# Patient Record
Sex: FEMALE | Race: WHITE | URBAN - METROPOLITAN AREA
[De-identification: names, ages, dates, MRNs, and addresses within clinical notes are randomized per-mention and may not be internally consistent; named-entity substitution may affect disease eponyms.]

---

## 2017-08-18 ENCOUNTER — RADIANT APPOINTMENT (OUTPATIENT)
Dept: GENERAL RADIOLOGY | Facility: OTHER | Age: 62
End: 2017-08-18
Attending: ORTHOPAEDIC SURGERY
Payer: COMMERCIAL

## 2017-08-18 ENCOUNTER — OFFICE VISIT (OUTPATIENT)
Dept: ORTHOPEDICS | Facility: OTHER | Age: 62
End: 2017-08-18
Payer: COMMERCIAL

## 2017-08-18 VITALS — TEMPERATURE: 97 F | HEIGHT: 62 IN | WEIGHT: 148 LBS | BODY MASS INDEX: 27.23 KG/M2

## 2017-08-18 DIAGNOSIS — S76.312A LEFT HAMSTRING MUSCLE STRAIN, INITIAL ENCOUNTER: Primary | ICD-10-CM

## 2017-08-18 DIAGNOSIS — M25.562 LEFT KNEE PAIN: ICD-10-CM

## 2017-08-18 PROCEDURE — 73562 X-RAY EXAM OF KNEE 3: CPT | Mod: LT

## 2017-08-18 PROCEDURE — 99203 OFFICE O/P NEW LOW 30 MIN: CPT | Performed by: ORTHOPAEDIC SURGERY

## 2017-08-18 ASSESSMENT — PAIN SCALES - GENERAL: PAINLEVEL: NO PAIN (0)

## 2017-08-18 NOTE — MR AVS SNAPSHOT
After Visit Summary   8/18/2017    Allyn Pascal    MRN: [id number]           Patient Information     Date Of Birth          1955        Visit Information        Provider Department      8/18/2017 4:10 PM Rasheeda Seaman MD [facility]        Today's Diagnoses     Left hamstring muscle strain, initial encounter    -  1      Care Instructions    Encounter Diagnosis   Name Primary?     Left hamstring muscle strain, initial encounter Yes     Rest, ice and elevate above heart level as needed for pain control  1. Your xrays look good, no fracture and good bone.  2. We know you had some stiffness when bending but it has been getting better.   3. Continue with Max Freeze if it is helping.  4. Holding on medication for now.  5. We feel you have a hamstring strain.  This will take 1-2 months to get better.  6. We have exercises below.  7. We discussed a brace that may help you; but we decided to hold off on that today.   8. Follow up with Rasheeda Seaman MD and/or William Moore PA-C on an as needed basis.    Wall Squats for ACL Healing    After you regain muscle control, it s time to build strength. This helps you put full weight on your leg. For best results, warm up and stretch before starting. If your injury is recent, wait until swelling and pain decrease before doing this exercise:    Lean against a wall with your feet hip-width apart. Your feet should be about 18 inches from the wall.    Slowly slide down to a near-sitting position. Don t let your knees go past 90 degrees.    Hold for 10 seconds, then slide back up.    Repeat 5 times.     CAUTION: Do this exercise only if your healthcare provider says it s OK.     9492-2639 [employer]. [address] [place] [address]. All rights reserved. This information is not intended as a substitute for professional medical care. Always follow your healthcare professional's instructions.        Leg and Knee  Exercises: Leg Raise    This exercise is designed to stretch and strengthen your knee. Before beginning, read through all the instructions. While exercising, breathe normally and use smooth movements. If you feel any pain, stop the exercise. If pain persists, call your healthcare provider.  Caution    Don t arch your back.    Don t hunch your shoulders.     Sit on the floor with your_________ leg straight, the other bent.    Tighten the thigh muscles on the top of your straight leg. You should feel the muscles contract. Raise that leg 6-8 inches. Then lower it slowly and steadily to the floor. Relax.    Repeat ______ times.  Do ______ sets a day.    6593-2837 [employer]. [address] [place] [address]. All rights reserved. This information is not intended as a substitute for professional medical care. Always follow your healthcare professional's instructions.        Quad Set for Leg and Knee    This exercise is designed to stretch and strengthen your knee. Before beginning, read through all the instructions. While exercising, breathe normally and use smooth movements. If you feel any pain, stop the exercise. If pain persists, call your healthcare provider.  1.  Sit on the floor with one leg straight, the other bent.  2.  Flex the foot of your straight leg by pointing your toes toward you. Press the back of your knee into the floor while tightening the muscle on the top of your thigh. Hold for ______ seconds. Then relax.  3.  Repeat ______ times. Do ______ sets a day.  Caution    Don t arch your back.    Don t hunch your shoulders.    1668-8681 [employer]. [address] [place] [address]. All rights reserved. This information is not intended as a substitute for professional medical care. Always follow your healthcare professional's instructions.        WebMD and [employer] may offer reliable information regarding your diagnosis and treatment plan.    THANK YOU  for coming in today. If you receive a survey via [employer] or mail please let us know if there was anything you especially appreciated today or if there is any way we can improve our clinic. We appreciate your input.    GENERAL INFORMATION:  Our hours are:  Monday :     Clinic 7:30 AM-430 PM ([facility])  Tuesday:      Operating Room All Day ([facility])  Wednesday: Clinic 7:30 AM - 11:15 AM ([facility])             Clinic 1:00 PM - 4:00PM ([facility])  Thursday:     Administrative Day  Friday:          Clinic 7:30 AM - 11:15 AM ([facility])            Clinic 1:00 PM - 4:00 PM ([facility])      Bone and Joint Service Line for any issues or concerns: [phone]      We are not in the office Thursdays. Therefore non- urgent calls and medical messages received on Thursday will be addressed when we are back in the office on Wednesday. Urgent matters will be reviewed and addressed by one of our partners in the office as needed.    If lab work was done today as part of your evaluation you will generally be contacted via [employer], mail, or phone with the results within 1-5 days. If there is an alarming result we will contact you by phone. Lab results come back at varying times, I generally wait until all labs are resulted before making comments on results. Please note labs are automatically released to [employer] (if you have signed up for it) once available-at times you may see these prior to my having a chance to review them as well.    If you need refills please contact your pharmacist. They will send a refill request to me to review. Please allow 3 business days for us to process all refill requests. All narcotic refills should be handled in the clinic at the time of your visit.       Hamstring Stretch    Begin your rehabilitation with exercises that develop muscle control. These help you meet  basic goals, like driving a car or going back to work. Exercise as often as you re advised. But stop right away if any exercise causes sharp or increasing pain. Icing your knee for 15 to 20 minutes after exercise can help prevent swelling and soreness.    Lie on your back with your good knee bent. Put a towel around the back of your injured leg. Tighten your stomach muscles.    Keeping the knee as straight as you can, slowly pull on the towel to bring your injured leg up. Raise it as far as you comfortably can.    Hold for 30 to 60 seconds. Repeat 2 to 3 times.   Caution: If you feel tingling or pain in your back or legs, you re not yet ready for this exercise or are pulling too aggressively.   For your safety, check with your healthcare provider before starting an exercise program.   Date Last Reviewed: 8/16/2015 2000-2017 [employer]. [address] [place] [address]. All rights reserved. This information is not intended as a substitute for professional medical care. Always follow your healthcare professional's instructions.                Follow-ups after your visit        Follow-up notes from your care team     Return if symptoms worsen or fail to improve.      Who to contact     If you have questions or need follow up information about today's clinic visit or your schedule please contact [facility] directly at [phone].  Normal or non-critical lab and imaging results will be communicated to you by MyChart, letter or phone within 4 business days after the clinic has received the results. If you do not hear from us within 7 days, please contact the clinic through MyChart or phone. If you have a critical or abnormal lab result, we will notify you by phone as soon as possible.  Submit refill requests through [employer] or call your pharmacy and they will forward the refill request to us. Please allow 3 business days for your refill to be completed.          Additional  "Information About Your Visit        MyChart Information     [employer] lets you send messages to your doctor, view your test results, renew your prescriptions, schedule appointments and more. To sign up, go to [email or web address] . Click on \"Log in\" on the left side of the screen, which will take you to the Welcome page. Then click on \"Sign up Now\" on the right side of the page.     You will be asked to enter the access code listed below, as well as some personal information. Please follow the directions to create your username and password.     Your access code is: [email or web address]  Expires: 2017  4:46 PM     Your access code will  in 90 days. If you need help or a new code, please call your [facility] clinic or [phone].        Care EveryWhere ID     This is your Care EveryWhere ID. This could be used by other organizations to access your [facility] medical records  [id number]        Your Vitals Were     Temperature Height BMI (Body Mass Index)             97  F (36.1  C) 1.562 m (5' 1.5\") 27.51 kg/m2          Blood Pressure from Last 3 Encounters:   01/22/15 132/80   09 130/90   08 120/80    Weight from Last 3 Encounters:   17 67.1 kg (148 lb)   01/22/15 63.5 kg (140 lb)   09 64.9 kg (143 lb)               Primary Care Provider Office Phone # Fax #    Criss Wanda Odom [phone]       [address] [place] [address]        Equal Access to Services     [facility]: Hadii nicholas garcia hadasho Sokayleen, waaxda luqadaha, qaybta kaalmarober koehler. So [facility] [phone].    ATENCIÓN: Si habla español, tiene a bess disposición servicios gratuitos de asistencia lingüística. Llame al [phone].    We comply with applicable federal civil rights laws and [place] laws. We do not discriminate on the basis of race, color, national origin, age, disability sex, sexual orientation or gender identity.            Thank you!     Thank " you for choosing [facility]  for your care. Our goal is always to provide you with excellent care. Hearing back from our patients is one way we can continue to improve our services. Please take a few minutes to complete the written survey that you may receive in the mail after your visit with us. Thank you!             Your Updated Medication List - Protect others around you: Learn how to safely use, store and throw away your medicines at www.disposemymeds.org.          This list is accurate as of: 8/18/17  4:46 PM.  Always use your most recent med list.                   Brand Name Dispense Instructions for use Diagnosis    cyclobenzaprine 5 MG tablet    FLEXERIL    30 tablet    Take 1 tablet (5 mg) by mouth 3 times daily as needed for muscle spasms    Neck sprain, initial encounter       * INDERAL 20 MG tablet   Generic drug:  propranolol     68    one tab twice daily    Migraine, unspecified, without mention of intractable migraine without mention of status migrainosus       * propranolol HCl 60 MG Cp24     90 capsule    Take 1 capsule by mouth daily.    Migraine, unspecified, without mention of intractable migraine without mention of status migrainosus       * Notice:  This list has 2 medication(s) that are the same as other medications prescribed for you. Read the directions carefully, and ask your doctor or other care provider to review them with you.

## 2017-08-18 NOTE — NURSING NOTE
"Chief Complaint   Patient presents with     Consult     left knee        Initial Temp 97  F (36.1  C)  Ht 1.562 m (5' 1.5\")  Wt 67.1 kg (148 lb)  BMI 27.51 kg/m2 Estimated body mass index is 27.51 kg/(m^2) as calculated from the following:    Height as of this encounter: 1.562 m (5' 1.5\").    Weight as of this encounter: 67.1 kg (148 lb).  Medication Reconciliation: complete    BP completed using cuff size: NA (Not Taken)    Harmony Landeros MA      "

## 2017-08-18 NOTE — PROGRESS NOTES
ORTHOPEDIC CONSULT      Chief Complaint: Allyn Pascal is a 61 year old female who is OOW.      She is being seen for   Chief Complaints and History of Present Illnesses   Patient presents with     Consult     left knee          History of Present Illness:   Allyn Pascal is a 61 year old female who is seen in consultation at the request of no one.  History of Present illness:  Allyn presents for evaluation of:  1.) left knee  Onset:  2 days ago    Symptoms brought on by not sure.   Location:  left knee.    Character:  numbness.    Progression of symptoms:  better.    Previous similar pain: no .   Pain Level:  0/10.   Previous treatments:  deep heating rub.  Currently on Blood thinners? no  Diagnosis of Diabetes? No  Was moving heavy boxes and started having knee pain a few days ago  Has decreased ROM, lat>med knee pain  Has been using a maxi freeze rub which helps  Has improved, increased ROM now      Patient's past medical, surgical, social and family histories reviewed.     Past Medical History:   Diagnosis Date     Endometriosis, site unspecified      Migraine, unspecified, without mention of intractable migraine without mention of status migrainosus      Other and unspecified ovarian cyst        Past Surgical History:   Procedure Laterality Date     C NONSPECIFIC PROCEDURE  02/24/2003    Exam under anesthesia, exploratory laparotomy, left ureterolysis, left salpingo-oophorectomy, extensive lysis of adhesions, rigid proctoscopy, repair of serosal tear of sigmoid colon.     C TOTAL ABDOM HYSTERECTOMY  1994    Hysterectomy, Total Abdominal     HC REMOVAL OF OVARY/TUBE(S)  02/24/2003    Salpingo-Oophorectomy, Unilateral Left     HC REMOVAL OF TONSILS,<10 Y/O      Tonsils <11y.o.       Medications:    Current Outpatient Prescriptions on File Prior to Visit:  cyclobenzaprine (FLEXERIL) 5 MG tablet Take 1 tablet (5 mg) by mouth 3 times daily as needed for muscle spasms   Propranolol HCl 60 MG CP24 Take 1 capsule by  "mouth daily.   INDERAL 20 MG OR TABS one tab twice daily     No current facility-administered medications on file prior to visit.     Allergies   Allergen Reactions     Sulfa Drugs      difficult breathing       Social History     Occupational History     Not on file.     Social History Main Topics     Smoking status: Passive Smoke Exposure - Never Smoker     Smokeless tobacco: Not on file      Comment:  smokes in the home     Alcohol use No     Drug use: Not on file     Sexual activity: Yes     Partners: Male       No family history on file.    REVIEW OF SYSTEMS  10 point review systems performed otherwise negative as noted as per history of present illness.    Physical Exam:  Vitals: Temp 97  F (36.1  C)  Ht 1.562 m (5' 1.5\")  Wt 67.1 kg (148 lb)  BMI 27.51 kg/m2  BMI= Body mass index is 27.51 kg/(m^2).    Constitutional: healthy, alert and no acute distress   Psychiatric: mentation appears normal and affect normal/bright  NEURO: no focal deficits  RESP: Normal with easy respirations and no use of accessory muscles to breathe, no audible wheezing or retractions  CV: regular pulse  SKIN: No erythema, rashes, excoriation, or breakdown. No evidence of infection.   JOINT/EXTREMITIES:left Knee Exam: Inspection: AP/lateral alignment normal, No effusion, No quad atrophy  Tender: medial and lateral hamstring region  Non-tender: MCL, LCL, lateral joint line, medial joint line  Active Range of Motion: all normal  Strength: normal  Special tests: normal Valgus stress test, normal Varus, negative Lachman's test      GAIT: antalgic  Lymph: no palpable lymph nodes    Diagnostic Modalities:  left knee X-ray: No fracture, dislocation and or lesion. Normal alignment.  Joint space maintained no significant arthritis. No appreciable soft tissue abnormality  Independent visualization of the images was performed.      Impression: left hamstring strain    Plan:  All of the above pertinent physical exam and imaging modalities " findings was reviewed with Allyn.                                          CONSERVATIVE CARE:  I recommend conservative care for the patient to include focused self directed physical therapy, activity modifications. Today I provided or dispensed info and hep.                                                FUTURE PLAN:  On their return if they still have symptoms we will consider physical therapy, MRI, NSAID's.      Return to clinic PRN, or sooner as needed for changes.  Re-x-ray on return: No    Rasheeda Seaman M.D.

## 2017-08-18 NOTE — PATIENT INSTRUCTIONS
Encounter Diagnosis   Name Primary?     Left hamstring muscle strain, initial encounter Yes     Rest, ice and elevate above heart level as needed for pain control  1. Your xrays look good, no fracture and good bone.  2. We know you had some stiffness when bending but it has been getting better.   3. Continue with Max Freeze if it is helping.  4. Holding on medication for now.  5. We feel you have a hamstring strain.  This will take 1-2 months to get better.  6. We have exercises below.  7. We discussed a brace that may help you; but we decided to hold off on that today.   8. Follow up with Rasheeda Seaman MD and/or William Moore PA-C on an as needed basis.    Wall Squats for ACL Healing    After you regain muscle control, it s time to build strength. This helps you put full weight on your leg. For best results, warm up and stretch before starting. If your injury is recent, wait until swelling and pain decrease before doing this exercise:    Lean against a wall with your feet hip-width apart. Your feet should be about 18 inches from the wall.    Slowly slide down to a near-sitting position. Don t let your knees go past 90 degrees.    Hold for 10 seconds, then slide back up.    Repeat 5 times.     CAUTION: Do this exercise only if your healthcare provider says it s OK.     3368-8886 [employer]. [address] [place] [address], [place] [id number]. All rights reserved. This information is not intended as a substitute for professional medical care. Always follow your healthcare professional's instructions.        Leg and Knee Exercises: Leg Raise    This exercise is designed to stretch and strengthen your knee. Before beginning, read through all the instructions. While exercising, breathe normally and use smooth movements. If you feel any pain, stop the exercise. If pain persists, call your healthcare provider.  Caution    Don t arch your back.    Don t hunch your shoulders.     Sit on the floor  with your_________ leg straight, the other bent.    Tighten the thigh muscles on the top of your straight leg. You should feel the muscles contract. Raise that leg 6-8 inches. Then lower it slowly and steadily to the floor. Relax.    Repeat ______ times.  Do ______ sets a day.    3499-9813 [employer]. [address] [place] [address]. All rights reserved. This information is not intended as a substitute for professional medical care. Always follow your healthcare professional's instructions.        Quad Set for Leg and Knee    This exercise is designed to stretch and strengthen your knee. Before beginning, read through all the instructions. While exercising, breathe normally and use smooth movements. If you feel any pain, stop the exercise. If pain persists, call your healthcare provider.  1.  Sit on the floor with one leg straight, the other bent.  2.  Flex the foot of your straight leg by pointing your toes toward you. Press the back of your knee into the floor while tightening the muscle on the top of your thigh. Hold for ______ seconds. Then relax.  3.  Repeat ______ times. Do ______ sets a day.  Caution    Don t arch your back.    Don t hunch your shoulders.    1705-8716 [employer]. [address] [place] [address]. All rights reserved. This information is not intended as a substitute for professional medical care. Always follow your healthcare professional's instructions.        [employer] and [employer] may offer reliable information regarding your diagnosis and treatment plan.    THANK YOU for coming in today. If you receive a survey via [employer] or mail please let us know if there was anything you especially appreciated today or if there is any way we can improve our clinic. We appreciate your input.    GENERAL INFORMATION:  Our hours are:  Monday :     Clinic 7:30 AM-430 PM ([facility])  Tuesday:      Operating Room All Day ([facility])  Wednesday: Clinic 7:30 AM - 11:15 AM ([facility])             Clinic 1:00 PM - 4:00PM ([facility])  Thursday:     Administrative Day  Friday:          Clinic 7:30 AM - 11:15 AM ([facility])            Clinic 1:00 PM - 4:00 PM ([facility])      Bone and Joint Service Line for any issues or concerns: [phone]      We are not in the office Thursdays. Therefore non- urgent calls and medical messages received on Thursday will be addressed when we are back in the office on Wednesday. Urgent matters will be reviewed and addressed by one of our partners in the office as needed.    If lab work was done today as part of your evaluation you will generally be contacted via [employer], mail, or phone with the results within 1-5 days. If there is an alarming result we will contact you by phone. Lab results come back at varying times, I generally wait until all labs are resulted before making comments on results. Please note labs are automatically released to [employer] (if you have signed up for it) once available-at times you may see these prior to my having a chance to review them as well.    If you need refills please contact your pharmacist. They will send a refill request to me to review. Please allow 3 business days for us to process all refill requests. All narcotic refills should be handled in the clinic at the time of your visit.       Hamstring Stretch    Begin your rehabilitation with exercises that develop muscle control. These help you meet basic goals, like driving a car or going back to work. Exercise as often as you re advised. But stop right away if any exercise causes sharp or increasing pain. Icing your knee for 15 to 20 minutes after exercise can help prevent swelling and soreness.    Lie on your back with your good knee bent. Put a towel around the back of your injured leg. Tighten your stomach  muscles.    Keeping the knee as straight as you can, slowly pull on the towel to bring your injured leg up. Raise it as far as you comfortably can.    Hold for 30 to 60 seconds. Repeat 2 to 3 times.   Caution: If you feel tingling or pain in your back or legs, you re not yet ready for this exercise or are pulling too aggressively.   For your safety, check with your healthcare provider before starting an exercise program.   Date Last Reviewed: 8/16/2015 2000-2017 [employer]. [address] [place] [address], [place] [address]. All rights reserved. This information is not intended as a substitute for professional medical care. Always follow your healthcare professional's instructions.

## 2017-11-05 ENCOUNTER — TRANSFERRED RECORDS (OUTPATIENT)
Dept: HEALTH INFORMATION MANAGEMENT | Facility: CLINIC | Age: 62
End: 2017-11-05

## 2017-11-13 ENCOUNTER — TELEPHONE (OUTPATIENT)
Dept: FAMILY MEDICINE | Facility: OTHER | Age: 62
End: 2017-11-13

## 2017-11-13 NOTE — PROGRESS NOTES
SUBJECTIVE:                                                    Allyn Pascal is a 61 year old female who presents to clinic today for the following health issues:    HPI    Concern - Right shoulder and arm pain   Onset: last week Sunday     Description:   Fell down the stairs, scuffed up right elbow. Hard to lift arm, hurts on the inside     Intensity: moderate    Progression of Symptoms:  same    (was seen at [facility] on 11/5/17, said to follow up if it wasn't getting better)    Therapies Tried and outcome: ice and ibuprofen, has also been using oils.     Had additional fall in March 2017 where she also landed on same right elbow stating that has had pain off and on since this time. Now is much worse with current.     XR HUMERUS ROUTINE RT 2 VIEWS11/6/2017  [facility]  Result Narrative   EXAMINATION:  TWO VIEW RIGHT HUMERUS:  11/06/2017     CLINICAL DATA:  Injury to the right humerus with right humerus pain.      FINDINGS:  Two views of the right humerus demonstrate no evidence of acute   fractures or dislocations.      IMPRESSION:  Normal examination.      Status Results Details              Problem list and histories reviewed & adjusted, as indicated.  Additional history: as documented    BP Readings from Last 3 Encounters:   11/14/17 138/84   01/22/15 132/80   01/21/09 130/90    Wt Readings from Last 3 Encounters:   11/14/17 156 lb (70.8 kg)   08/18/17 148 lb (67.1 kg)   01/22/15 140 lb (63.5 kg)        Labs reviewed in EPIC      ROS:  Constitutional, HEENT, cardiovascular, pulmonary, gi and gu systems are negative, except as otherwise noted.      OBJECTIVE:   /84  Pulse 66  Temp 98  F (36.7  C) (Oral)  Resp 16  Wt 156 lb (70.8 kg)  BMI 29 kg/m2  Body mass index is 29 kg/(m^2).  GENERAL: healthy, alert and no distress  NECK: no adenopathy, no asymmetry, masses, or scars and thyroid normal to palpation  MS: right arm able flex elbow without pain, has  increased pain with extension only. Negative for inflammation, erythema, or warmth. Able to lift arm to shoulder height without pain. Pain increased with lowering are. Pain is in elbow joint.   SKIN: no suspicious lesions or rashes  NEURO: Normal strength and tone, mentation intact and speech normal    Diagnostic Test Results:  none     ASSESSMENT/PLAN:     1. Elbow injury, right, initial encounter  Recommend further evaluation and treatment plan by orthopedic specialty   - ORTHO  REFERRAL    2. Need for hepatitis C screening test  declined    3. Need for prophylactic vaccination and inoculation against influenza  declined    4. Breast screening declined  declined    Home care instructions were reviewed with the patient. The risks, benefits and treatment options of prescribed medications or other treatments have been discussed with the patient. The patient verbalized their understanding and should call or follow up if no improvement or if they develop further problems.  Return to clinic prn    Patient Instructions   Please follow up with Dr. Seaman or Dr. Radford for further evaluation and treatment plan.     Thank you  Leeanne Parrish [facility]

## 2017-11-13 NOTE — TELEPHONE ENCOUNTER
Patient was seen at [facility] and had some arm pain, she had xray and showed no fracture. They said pehaps her PCP would want to order an MRI?   Please call to discuss.

## 2017-11-14 ENCOUNTER — OFFICE VISIT (OUTPATIENT)
Dept: FAMILY MEDICINE | Facility: OTHER | Age: 62
End: 2017-11-14
Payer: COMMERCIAL

## 2017-11-14 VITALS
RESPIRATION RATE: 16 BRPM | SYSTOLIC BLOOD PRESSURE: 138 MMHG | DIASTOLIC BLOOD PRESSURE: 84 MMHG | TEMPERATURE: 98 F | BODY MASS INDEX: 29 KG/M2 | WEIGHT: 156 LBS | HEART RATE: 66 BPM

## 2017-11-14 DIAGNOSIS — Z23 NEED FOR PROPHYLACTIC VACCINATION AND INOCULATION AGAINST INFLUENZA: ICD-10-CM

## 2017-11-14 DIAGNOSIS — Z53.20 BREAST SCREENING DECLINED: ICD-10-CM

## 2017-11-14 DIAGNOSIS — S59.901A ELBOW INJURY, RIGHT, INITIAL ENCOUNTER: Primary | ICD-10-CM

## 2017-11-14 DIAGNOSIS — Z11.59 NEED FOR HEPATITIS C SCREENING TEST: ICD-10-CM

## 2017-11-14 PROCEDURE — 99213 OFFICE O/P EST LOW 20 MIN: CPT | Performed by: NURSE PRACTITIONER

## 2017-11-14 NOTE — PATIENT INSTRUCTIONS
Please follow up with Dr. Seaman or Dr. Radford for further evaluation and treatment plan.     Thank you  Leeanne Parrish CNP

## 2017-11-14 NOTE — NURSING NOTE
"Chief Complaint   Patient presents with     Musculoskeletal Problem     Panel Management     shakeel, mychart, flu, mammo, honoring choices, hep c       Initial /82 (BP Location: Left arm, Patient Position: Chair, Cuff Size: Adult Large)  Pulse 66  Temp 98  F (36.7  C) (Oral)  Resp 16  Wt 156 lb (70.8 kg)  BMI 29 kg/m2 Estimated body mass index is 29 kg/(m^2) as calculated from the following:    Height as of 8/18/17: 5' 1.5\" (1.562 m).    Weight as of this encounter: 156 lb (70.8 kg).  Medication Reconciliation: complete    "

## 2017-11-14 NOTE — MR AVS SNAPSHOT
After Visit Summary   11/14/2017    Allyn Pascal    MRN: [id number]           Patient Information     Date Of Birth          1955        Visit Information        Provider Department      11/14/2017 2:00 PM Leeanne Parrish APRN CNP [facility]        Today's Diagnoses     Elbow injury, right, initial encounter    -  1    Visit for screening mammogram        Need for hepatitis C screening test        Need for prophylactic vaccination and inoculation against influenza          Care Instructions    Please follow up with Dr. Seaman or Dr. Radford for further evaluation and treatment plan.     Thank you  Leeanne Parrish CNP            Follow-ups after your visit        Additional Services     ORTHO  REFERRAL       [facility] is referring you to the Orthopedic  Services at [facility].       The  Representative will assist you in the coordination of your Orthopedic and Musculoskeletal Care as prescribed by your physician.    The  Representative will call you within 1 business day to help schedule your appointment, or you may contact the  Representative at:    All areas ~ [phone]     Type of Referral : Surgical / Specialist       Timeframe requested: 3 - 5 days    Coverage of these services is subject to the terms and limitations of your health insurance plan.  Please call member services at your health plan with any benefit or coverage questions.      If X-rays, CT or MRI's have been performed, please contact the facility where they were done to arrange for , prior to your scheduled appointment.  Please bring this referral request to your appointment and present it to your specialist.                  Who to contact     If you have questions or need follow up information about today's clinic visit or your schedule please contact [facility] directly at  [phone].  Normal or non-critical lab and imaging results will be communicated to you by MyChart, letter or phone within 4 business days after the clinic has received the results. If you do not hear from us within 7 days, please contact the clinic through MyChart or phone. If you have a critical or abnormal lab result, we will notify you by phone as soon as possible.  Submit refill requests through [employer] or call your pharmacy and they will forward the refill request to us. Please allow 3 business days for your refill to be completed.          Additional Information About Your Visit        Care EveryWhere ID     This is your Care EveryWhere ID. This could be used by other organizations to access your [place] medical records  [id number]        Your Vitals Were     Pulse Temperature Respirations BMI (Body Mass Index)          66 98  F (36.7  C) (Oral) 16 29 kg/m2         Blood Pressure from Last 3 Encounters:   11/14/17 142/82   01/22/15 132/80   01/21/09 130/90    Weight from Last 3 Encounters:   11/14/17 156 lb (70.8 kg)   08/18/17 148 lb (67.1 kg)   01/22/15 140 lb (63.5 kg)              We Performed the Following     ORTHO  REFERRAL        Primary Care Provider Office Phone # Fax #    Criss Odom [phone]       [address] [place] [address]        Equal Access to Services     [facility] : Hadii aad ku hadasho Soomaali, waaxda luqadaha, qaybta kaalmada denny, rober dugan . So [facility] [phone].    ATENCIÓN: Si habla español, tiene a bess disposición servicios gratuitos de asistencia lingüística. Amy al [phone].    We comply with applicable federal civil rights laws and [place] laws. We do not discriminate on the basis of race, color, national origin, age, disability, sex, sexual orientation, or gender identity.            Thank you!     Thank you for choosing [facility]  for your care. Our goal is always to  provide you with excellent care. Hearing back from our patients is one way we can continue to improve our services. Please take a few minutes to complete the written survey that you may receive in the mail after your visit with us. Thank you!             Your Updated Medication List - Protect others around you: Learn how to safely use, store and throw away your medicines at www.disposemymeds.org.      Notice  As of 11/14/2017  2:50 PM    You have not been prescribed any medications.

## 2017-11-20 NOTE — PROGRESS NOTES
ORTHOPEDIC CONSULT      Chief Complaint: Allyn Pascal is a 61 year old right hand dominant female who works as a retired.      She is being seen for   Chief Complaints and History of Present Illnesses   Patient presents with     Consult     rt shoulder pain from fall per Leeanne Parrish CNP         History of Present Illness:   Allyn Pascal is a 61 year old female who is seen in consultation at the request of Leeanne Parrish CNP.  History of Present illness:  Allyn presents for evaluation of:  1.rt humerus  Onset: early and November  Symptoms brought on by fall.   Progression of symptoms:  same.    Previous similar pain: YES.   Pain Level:  6/10.   Previous treatments:  ice and Ibuprofen.  Currently on Blood thinners? no  Diagnosis of Diabetes? no  Pain lateral shoulder and inner arm  Doing HEP, ibuprofen, ice - all help      Patient's past medical, surgical, social and family histories reviewed.     Past Medical History:   Diagnosis Date     Endometriosis, site unspecified      Migraine, unspecified, without mention of intractable migraine without mention of status migrainosus      Other and unspecified ovarian cyst        Past Surgical History:   Procedure Laterality Date     C NONSPECIFIC PROCEDURE  02/24/2003    Exam under anesthesia, exploratory laparotomy, left ureterolysis, left salpingo-oophorectomy, extensive lysis of adhesions, rigid proctoscopy, repair of serosal tear of sigmoid colon.     C TOTAL ABDOM HYSTERECTOMY  1994    Hysterectomy, Total Abdominal     HC REMOVAL OF OVARY/TUBE(S)  02/24/2003    Salpingo-Oophorectomy, Unilateral Left     HC REMOVAL OF TONSILS,<13 Y/O      Tonsils <11y.o.       Medications:    No current outpatient prescriptions on file prior to visit.  No current facility-administered medications on file prior to visit.     Allergies   Allergen Reactions     Sulfa Drugs      difficult breathing       Social History     Occupational History     Not on file.     Social History Main  "Topics     Smoking status: Passive Smoke Exposure - Never Smoker     Smokeless tobacco: Never Used      Comment:  smokes in the home     Alcohol use No     Drug use: Not on file     Sexual activity: Yes     Partners: Male       No pertinent family history.    REVIEW OF SYSTEMS  10 point review systems performed otherwise negative as noted as per history of present illness.    Physical Exam:  Vitals: Ht 1.549 m (5' 1\")  Wt 70.8 kg (156 lb)  BMI 29.48 kg/m2  BMI= Body mass index is 29.48 kg/(m^2).    Constitutional: healthy, alert and no acute distress   Psychiatric: mentation appears normal and affect normal/bright  NEURO: no focal deficits  RESP: Normal with easy respirations and no use of accessory muscles to breathe, no audible wheezing or retractions  CV: regular pulse  SKIN: No erythema, rashes, excoriation, or breakdown. No evidence of infection.   JOINT/EXTREMITIES:right shoulder - mild tenderness distal medial biceps, FROM, positive impingement, weak in abduction and ER, positive ER lag, good strength in IR  GAIT: non-antalgic  Lymph: no palpable lymph nodes    Diagnostic Modalities:  right elbow xrays - mild DJD  right shoulder X-ray: Well preserved glenohumeral articular space. No fracture, dislocation and or lesion.   Independent visualization of the images was performed.      Impression: right Shoulder pain with weakness - possible rotator cuff tear    Plan:  All of the above pertinent physical exam and imaging modalities findings was reviewed with Allyn and her daughter.                                          CONSERVATIVE CARE:  I recommend conservative care for the patient to include NSAIDs, Tylenol, focused self directed physical therapy. Today I provided or dispensed info, HEP, MRI referral.                                                FUTURE PLAN:  On their return if they still have symptoms we will consider To be determined based on the test results..      Return to clinic to discuss " test results, or sooner as needed for changes.  Re-x-ray on return: Francia Seaman M.D.

## 2017-11-22 ENCOUNTER — RADIANT APPOINTMENT (OUTPATIENT)
Dept: GENERAL RADIOLOGY | Facility: OTHER | Age: 62
End: 2017-11-22
Attending: ORTHOPAEDIC SURGERY
Payer: COMMERCIAL

## 2017-11-22 ENCOUNTER — OFFICE VISIT (OUTPATIENT)
Dept: ORTHOPEDICS | Facility: OTHER | Age: 62
End: 2017-11-22
Payer: COMMERCIAL

## 2017-11-22 VITALS — BODY MASS INDEX: 29.45 KG/M2 | HEIGHT: 61 IN | WEIGHT: 156 LBS

## 2017-11-22 DIAGNOSIS — M25.511 SHOULDER PAIN, RIGHT: ICD-10-CM

## 2017-11-22 DIAGNOSIS — M75.101 TEAR OF RIGHT ROTATOR CUFF, UNSPECIFIED TEAR EXTENT: Primary | ICD-10-CM

## 2017-11-22 DIAGNOSIS — M25.521 ELBOW PAIN, RIGHT: ICD-10-CM

## 2017-11-22 PROCEDURE — 73080 X-RAY EXAM OF ELBOW: CPT | Mod: RT

## 2017-11-22 PROCEDURE — 73030 X-RAY EXAM OF SHOULDER: CPT | Mod: RT

## 2017-11-22 PROCEDURE — 99244 OFF/OP CNSLTJ NEW/EST MOD 40: CPT | Performed by: ORTHOPAEDIC SURGERY

## 2017-11-22 NOTE — LETTER
11/22/2017         RE: Allyn Pascal  [address] [place] [id number]        Dear Colleague,    Thank you for referring your patient, Allyn Pascal, to the [facility]. Please see a copy of my visit note below.    ORTHOPEDIC CONSULT      Chief Complaint: Allyn Pascal is a 61 year old right hand dominant female who works as a retired.      She is being seen for   Chief Complaints and History of Present Illnesses   Patient presents with     Consult     rt shoulder pain from fall per Leeanne Parrish CNP         History of Present Illness:   Allyn Pascal is a 61 year old female who is seen in consultation at the request of Leeanne Parrish CNP.  History of Present illness:  Allyn presents for evaluation of:  1.rt humerus  Onset: early and November  Symptoms brought on by fall.   Progression of symptoms:  same.    Previous similar pain: YES.   Pain Level:  6/10.   Previous treatments:  ice and Ibuprofen.  Currently on Blood thinners? no  Diagnosis of Diabetes? no  Pain lateral shoulder and inner arm  Doing HEP, ibuprofen, ice - all help      Patient's past medical, surgical, social and family histories reviewed.     Past Medical History:   Diagnosis Date     Endometriosis, site unspecified      Migraine, unspecified, without mention of intractable migraine without mention of status migrainosus      Other and unspecified ovarian cyst        Past Surgical History:   Procedure Laterality Date     C NONSPECIFIC PROCEDURE  02/24/2003    Exam under anesthesia, exploratory laparotomy, left ureterolysis, left salpingo-oophorectomy, extensive lysis of adhesions, rigid proctoscopy, repair of serosal tear of sigmoid colon.     C TOTAL ABDOM HYSTERECTOMY  1994    Hysterectomy, Total Abdominal     HC REMOVAL OF OVARY/TUBE(S)  02/24/2003    Salpingo-Oophorectomy, Unilateral Left     HC REMOVAL OF TONSILS,<11 Y/O      Tonsils <11y.o.       Medications:    No current outpatient prescriptions on file prior to  "visit.  No current facility-administered medications on file prior to visit.     Allergies   Allergen Reactions     Sulfa Drugs      difficult breathing       Social History     Occupational History     Not on file.     Social History Main Topics     Smoking status: Passive Smoke Exposure - Never Smoker     Smokeless tobacco: Never Used      Comment:  smokes in the home     Alcohol use No     Drug use: Not on file     Sexual activity: Yes     Partners: Male       No pertinent family history.    REVIEW OF SYSTEMS  10 point review systems performed otherwise negative as noted as per history of present illness.    Physical Exam:  Vitals: Ht 1.549 m (5' 1\")  Wt 70.8 kg (156 lb)  BMI 29.48 kg/m2  BMI= Body mass index is 29.48 kg/(m^2).    Constitutional: healthy, alert and no acute distress   Psychiatric: mentation appears normal and affect normal/bright  NEURO: no focal deficits  RESP: Normal with easy respirations and no use of accessory muscles to breathe, no audible wheezing or retractions  CV: regular pulse  SKIN: No erythema, rashes, excoriation, or breakdown. No evidence of infection.   JOINT/EXTREMITIES:right shoulder - mild tenderness distal medial biceps, FROM, positive impingement, weak in abduction and ER, positive ER lag, good strength in IR  GAIT: non-antalgic  Lymph: no palpable lymph nodes    Diagnostic Modalities:  right elbow xrays - mild DJD  right shoulder X-ray: Well preserved glenohumeral articular space. No fracture, dislocation and or lesion.   Independent visualization of the images was performed.      Impression: right Shoulder pain with weakness - possible rotator cuff tear    Plan:  All of the above pertinent physical exam and imaging modalities findings was reviewed with Allyn and her daughter.                                          CONSERVATIVE CARE:  I recommend conservative care for the patient to include NSAIDs, Tylenol, focused self directed physical therapy. Today I provided " or dispensed info, HEP, MRI referral.                                                FUTURE PLAN:  On their return if they still have symptoms we will consider To be determined based on the test results..      Return to clinic to discuss test results, or sooner as needed for changes.  Re-x-ray on return: No    Rasheeda Seaman M.D.    Again, thank you for allowing me to participate in the care of your patient.        Sincerely,        Rasheeda Seaman MD

## 2017-11-22 NOTE — PATIENT INSTRUCTIONS
Rotator Cuff Tear  The rotator cuff is a group of muscles and tendons that surround the shoulder joint. These muscles and tendons hold the arm in its joint. They also help the shoulder to rotate. The rotator cuff can be torn from overuse or injury. Gradual wear and tear can lead to inflammation of these tendons. This can progress to gradual or sudden tears.  Symptoms of a torn rotator cuff include:    Shoulder pain that gets worse when you raise your arm overhead    Weakness of the shoulder muscles with overhead activity    Popping and clicking when you move your shoulder    Shoulder pain that wakes you up at night when sleeping on the hurt shoulder  Diagnosis is made by an MRI or arthroscopy. This is a surgical procedure to look inside the joint through a small tube. Partial rotator cuff tears can be treated by first resting, then strengthening the rotator cuff muscles.  Anti-inflammatory medicines, such as ibuprofen or naproxen, are useful. A limited number of steroid injections can be given. Surgery may be recommended for complete tears and partial tears that do not respond to medical treatment.  Home care    Avoid activities that make your pain worse. This includes overhead activities, doing the same motion over and over, and heavy lifting.    You may use over-the-counter pain medicines to control pain, unless another medicine was prescribed. If you have chronic liver or kidney disease or ever had a stomach ulcer or GI bleeding, talk with your healthcare provider before using these medicines.    If you were given a sling, use it for comfort. After your pain decreases, don t keep your arm in the sling all the time. Take your arm out several times a day and move the shoulder joint, as you are able.    Your healthcare provider may recommend gentle pendulum exercises. Stand or sit with your arm vertical and close to your side. Relax your shoulder muscles and gently swing the arm forward and back, side to side, and  in small circles for about 5 minutes. Do this once or twice a day. There should be only slight pain with this exercise.    You may benefit from physical therapy or a home exercise program to strengthen your shoulder muscles. This will also increase your pain-free range of motion. Applying heat prior to exercises can help prepare the muscles and joint for activity. Talk to your healthcare provider about what is best for your condition.  Follow-up care  Follow up with your healthcare provider, or as advised.  When to seek medical advice  Call your healthcare provider right away if any of the following occur:    Increasing shoulder pain    Rapid swelling in the involved shoulder or arm    Numbness, tingling, or pain radiating down the arm to the hand    Loss of strength in the affected arm  Date Last Reviewed: 11/23/2015 2000-2017 [employer]. [address] [place] [address]. All rights reserved. This information is not intended as a substitute for professional medical care. Always follow your healthcare professional's instructions.        Shoulder Exercises: Side Raise    This exercise stretches and strengthens your shoulders. Before starting, read through all the instructions. During the exercise, breathe normally and use smooth movements. Stop if you feel any pain. If pain persists, call your healthcare provider.    Stand straight, holding a ____ pound weight in each hand, arms at sides, feet shoulder-width apart.    Slowly extend your arms up and out until weights are at shoulder level. Slowly return to starting position.    Repeat ____ times. Do ____ sets ____ times a day.     CAUTION: Don t swing the weights or raise weights above shoulder level.   Date Last Reviewed: 8/16/2015 2000-2017 [employer]. [address] [place] [address]. All rights reserved. This information is not intended as a substitute for professional medical care. Always follow your  healthcare professional's instructions.        Shoulder External Rotation, Side-Lying (Strength)    This exercise is for your right shoulder joint. Switch sides if the problem is in your left shoulder joint.  1. Lie on your left side with your head supported by a pillow. Place a small rolled-up towel under your right elbow.  2. Hold a hand weight in your right hand. Your healthcare provider will tell you what size hand weight to use.  3. Bend your arm in front of you at a right angle. Then bend it down and bring the hand weight to the floor. Rest your forearm on your stomach.  4. Keep your elbow on the towel and slowly lift the hand weight up in front of you. Stop when the weight is raised slightly higher than your elbow.  5. Lower the weight back down.  6. Repeat 5 to 15 times, or as instructed.  Date Last Reviewed: 3/10/2016    6958-2636 [employer]. [address] [place] [address]. All rights reserved. This information is not intended as a substitute for professional medical care. Always follow your healthcare professional's instructions.    Dr. Seaman has ordered Imaging test for you.  Please call [phone] to schedule this  You will also need to schedule a follow up visit for the results from your Imaging test  with Dr. Seaman.  You may call us at [phone] to schedule this appointment.  Please make this appointment for at least  2-3 days your test to go over the results.

## 2017-11-22 NOTE — MR AVS SNAPSHOT
After Visit Summary   11/22/2017    Allyn Pascal    MRN: [id number]           Patient Information     Date Of Birth          1955        Visit Information        Provider Department      11/22/2017 10:30 AM Rasheeda Seaman MD [facility]        Today's Diagnoses     Shoulder pain, right    -  1    Elbow pain, right          Care Instructions      Rotator Cuff Tear  The rotator cuff is a group of muscles and tendons that surround the shoulder joint. These muscles and tendons hold the arm in its joint. They also help the shoulder to rotate. The rotator cuff can be torn from overuse or injury. Gradual wear and tear can lead to inflammation of these tendons. This can progress to gradual or sudden tears.  Symptoms of a torn rotator cuff include:    Shoulder pain that gets worse when you raise your arm overhead    Weakness of the shoulder muscles with overhead activity    Popping and clicking when you move your shoulder    Shoulder pain that wakes you up at night when sleeping on the hurt shoulder  Diagnosis is made by an MRI or arthroscopy. This is a surgical procedure to look inside the joint through a small tube. Partial rotator cuff tears can be treated by first resting, then strengthening the rotator cuff muscles.  Anti-inflammatory medicines, such as ibuprofen or naproxen, are useful. A limited number of steroid injections can be given. Surgery may be recommended for complete tears and partial tears that do not respond to medical treatment.  Home care    Avoid activities that make your pain worse. This includes overhead activities, doing the same motion over and over, and heavy lifting.    You may use over-the-counter pain medicines to control pain, unless another medicine was prescribed. If you have chronic liver or kidney disease or ever had a stomach ulcer or GI bleeding, talk with your healthcare provider before using these medicines.    If you were given a sling, use it  for comfort. After your pain decreases, don t keep your arm in the sling all the time. Take your arm out several times a day and move the shoulder joint, as you are able.    Your healthcare provider may recommend gentle pendulum exercises. Stand or sit with your arm vertical and close to your side. Relax your shoulder muscles and gently swing the arm forward and back, side to side, and in small circles for about 5 minutes. Do this once or twice a day. There should be only slight pain with this exercise.    You may benefit from physical therapy or a home exercise program to strengthen your shoulder muscles. This will also increase your pain-free range of motion. Applying heat prior to exercises can help prepare the muscles and joint for activity. Talk to your healthcare provider about what is best for your condition.  Follow-up care  Follow up with your healthcare provider, or as advised.  When to seek medical advice  Call your healthcare provider right away if any of the following occur:    Increasing shoulder pain    Rapid swelling in the involved shoulder or arm    Numbness, tingling, or pain radiating down the arm to the hand    Loss of strength in the affected arm  Date Last Reviewed: 11/23/2015 2000-2017 [employer]. [address] [place] [address]. All rights reserved. This information is not intended as a substitute for professional medical care. Always follow your healthcare professional's instructions.        Shoulder Exercises: Side Raise    This exercise stretches and strengthens your shoulders. Before starting, read through all the instructions. During the exercise, breathe normally and use smooth movements. Stop if you feel any pain. If pain persists, call your healthcare provider.    Stand straight, holding a ____ pound weight in each hand, arms at sides, feet shoulder-width apart.    Slowly extend your arms up and out until weights are at shoulder level. Slowly return to  starting position.    Repeat ____ times. Do ____ sets ____ times a day.     CAUTION: Don t swing the weights or raise weights above shoulder level.   Date Last Reviewed: 8/16/2015 2000-2017 [employer]. [address] [place] [address]. All rights reserved. This information is not intended as a substitute for professional medical care. Always follow your healthcare professional's instructions.        Shoulder External Rotation, Side-Lying (Strength)    This exercise is for your right shoulder joint. Switch sides if the problem is in your left shoulder joint.  1. Lie on your left side with your head supported by a pillow. Place a small rolled-up towel under your right elbow.  2. Hold a hand weight in your right hand. Your healthcare provider will tell you what size hand weight to use.  3. Bend your arm in front of you at a right angle. Then bend it down and bring the hand weight to the floor. Rest your forearm on your stomach.  4. Keep your elbow on the towel and slowly lift the hand weight up in front of you. Stop when the weight is raised slightly higher than your elbow.  5. Lower the weight back down.  6. Repeat 5 to 15 times, or as instructed.  Date Last Reviewed: 3/10/2016    5448-6053 [employer]. [address] [place] [address]. All rights reserved. This information is not intended as a substitute for professional medical care. Always follow your healthcare professional's instructions.    Dr. Seaman has ordered Imaging test for you.  Please call [phone] to schedule this  You will also need to schedule a follow up visit for the results from your Imaging test  with Dr. Seaman.  You may call us at [phone] to schedule this appointment.  Please make this appointment for at least  2-3 days your test to go over the results.             Follow-ups after your visit        Who to contact     If you have questions or need follow up information about  "today's clinic visit or your schedule please contact [facility] directly at [phone].  Normal or non-critical lab and imaging results will be communicated to you by MyChart, letter or phone within 4 business days after the clinic has received the results. If you do not hear from us within 7 days, please contact the clinic through MyChart or phone. If you have a critical or abnormal lab result, we will notify you by phone as soon as possible.  Submit refill requests through [employer] or call your pharmacy and they will forward the refill request to us. Please allow 3 business days for your refill to be completed.          Additional Information About Your Visit        Care EveryWhere ID     This is your Care EveryWhere ID. This could be used by other organizations to access your [place] medical records  [id number]        Your Vitals Were     Height BMI (Body Mass Index)                1.549 m (5' 1\") 29.48 kg/m2           Blood Pressure from Last 3 Encounters:   11/14/17 138/84   01/22/15 132/80   01/21/09 130/90    Weight from Last 3 Encounters:   11/22/17 70.8 kg (156 lb)   11/14/17 70.8 kg (156 lb)   08/18/17 67.1 kg (148 lb)               Primary Care Provider Office Phone # Fax #    Criss Wanda Odom [phone]       [address] [place] [address]        Equal Access to Services     [facility]: Hadii nicholas garcia hadasho Sokayleen, waaxda luqadaha, qaybta kaalmada denny, rober dugan . So [facility] [phone].    ATENCIÓN: Si habla español, tiene a bess disposición servicios gratuitos de asistencia lingüística. Aureaame al [phone].    We comply with applicable federal civil rights laws and [place] laws. We do not discriminate on the basis of race, color, national origin, age, disability, sex, sexual orientation, or gender identity.            Thank you!     Thank you for choosing [facility]  for your care. Our goal is always to " provide you with excellent care. Hearing back from our patients is one way we can continue to improve our services. Please take a few minutes to complete the written survey that you may receive in the mail after your visit with us. Thank you!             Your Updated Medication List - Protect others around you: Learn how to safely use, store and throw away your medicines at www.disposemymeds.org.      Notice  As of 11/22/2017 11:52 AM    You have not been prescribed any medications.

## 2017-11-27 ENCOUNTER — HOSPITAL ENCOUNTER (OUTPATIENT)
Dept: MRI IMAGING | Facility: CLINIC | Age: 62
Discharge: HOME | End: 2017-11-27
Attending: ORTHOPAEDIC SURGERY | Admitting: ORTHOPAEDIC SURGERY
Payer: COMMERCIAL

## 2017-11-27 DIAGNOSIS — M75.101 TEAR OF RIGHT ROTATOR CUFF, UNSPECIFIED TEAR EXTENT: ICD-10-CM

## 2017-11-27 PROCEDURE — 73221 MRI JOINT UPR EXTREM W/O DYE: CPT | Mod: RT

## 2017-11-29 ENCOUNTER — OFFICE VISIT (OUTPATIENT)
Dept: ORTHOPEDICS | Facility: OTHER | Age: 62
End: 2017-11-29
Payer: COMMERCIAL

## 2017-11-29 VITALS — BODY MASS INDEX: 29.45 KG/M2 | WEIGHT: 156 LBS | TEMPERATURE: 96.1 F | HEIGHT: 61 IN

## 2017-11-29 DIAGNOSIS — M75.101 TEAR OF RIGHT ROTATOR CUFF, UNSPECIFIED TEAR EXTENT: Primary | ICD-10-CM

## 2017-11-29 PROCEDURE — 99213 OFFICE O/P EST LOW 20 MIN: CPT | Performed by: ORTHOPAEDIC SURGERY

## 2017-11-29 ASSESSMENT — PAIN SCALES - GENERAL: PAINLEVEL: MILD PAIN (3)

## 2017-11-29 NOTE — PATIENT INSTRUCTIONS
Rotator Cuff Tear  The rotator cuff is a group of muscles and tendons that surround the shoulder joint. These muscles and tendons hold the arm in its joint. They also help the shoulder to rotate. The rotator cuff can be torn from overuse or injury. Gradual wear and tear can lead to inflammation of these tendons. This can progress to gradual or sudden tears.  Symptoms of a torn rotator cuff include:    Shoulder pain that gets worse when you raise your arm overhead    Weakness of the shoulder muscles with overhead activity    Popping and clicking when you move your shoulder    Shoulder pain that wakes you up at night when sleeping on the hurt shoulder  Diagnosis is made by an MRI or arthroscopy. This is a surgical procedure to look inside the joint through a small tube. Partial rotator cuff tears can be treated by first resting, then strengthening the rotator cuff muscles.  Anti-inflammatory medicines, such as ibuprofen or naproxen, are useful. A limited number of steroid injections can be given. Surgery may be recommended for complete tears and partial tears that do not respond to medical treatment.  Home care    Avoid activities that make your pain worse. This includes overhead activities, doing the same motion over and over, and heavy lifting.    You may use over-the-counter pain medicines to control pain, unless another medicine was prescribed. If you have chronic liver or kidney disease or ever had a stomach ulcer or GI bleeding, talk with your healthcare provider before using these medicines.    If you were given a sling, use it for comfort. After your pain decreases, don t keep your arm in the sling all the time. Take your arm out several times a day and move the shoulder joint, as you are able.    Your healthcare provider may recommend gentle pendulum exercises. Stand or sit with your arm vertical and close to your side. Relax your shoulder muscles and gently swing the arm forward and back, side to side, and  in small circles for about 5 minutes. Do this once or twice a day. There should be only slight pain with this exercise.    You may benefit from physical therapy or a home exercise program to strengthen your shoulder muscles. This will also increase your pain-free range of motion. Applying heat prior to exercises can help prepare the muscles and joint for activity. Talk to your healthcare provider about what is best for your condition.  Follow-up care  Follow up with your healthcare provider, or as advised.  When to seek medical advice  Call your healthcare provider right away if any of the following occur:    Increasing shoulder pain    Rapid swelling in the involved shoulder or arm    Numbness, tingling, or pain radiating down the arm to the hand    Loss of strength in the affected arm  Date Last Reviewed: 11/23/2015 2000-2017 [employer]. [address] [place] [address]. All rights reserved. This information is not intended as a substitute for professional medical care. Always follow your healthcare professional's instructions.        Understanding a Rotator Cuff Tendon Tear    The rotator cuff is a group of 4 muscles and their tendons in the shoulder. The muscles are located in the front, back, and top of the shoulder joint. They each have a strong band of tissue (tendon) that attaches to the top of the upper arm bone. This helps keep the arm bone firmly in place in the socket of the shoulder joint. The muscles and tendons of the rotator cuff also help the shoulder joint with certain movements. These include reaching the arm over the head and rotating the arm.  Any one of the rotator cuff tendons can fray or tear from causes such as injury and overuse. A tear may be partial, with some of the tendon still intact. Or it may be a complete tear, with the tendon fully torn. Both types can cause pain and weakness, and limit arm and shoulder movement. A rotator cuff tear often needs treatment  to heal properly.  Causes of a rotator cuff tendon tear  Causes can include:    Wear and tear of the tendons from aging or normal use over time    Overuse of the tendons from sports or work activities, especially those that involve repeated overhead movements    Injury to the tendons from a fall or other accident  Symptoms of a rotator cuff tendon tear  Some people with a rotator cuff tendon tear have few or no symptoms. Others may have symptoms that range from mild to severe. Possible symptoms include:    Pain in your shoulder, which may be worse with overhead movements or at night from lying on the affected side    Weakness in your arm and shoulder    Trouble lifting your arm up or rotating your arm    Clicking or crackling sounds when moving or using your arm and shoulder  Treating a rotator cuff tendon tear  Treatment for a rotator cuff tendon tear depends on several factors. These include the severity of the tear and your symptoms. Options may include:    Resting your arm and shoulder. This involves limiting certain movements, such as reaching above your head or lifting your arm up. These can slow healing and worsen symptoms. You may also need to avoid certain sports and types of work for a time.    Cold packs or heat packs. These help reduce pain and swelling.    Prescription or over-the-counter pain medicines. These help reduce pain and swelling.    Injections of medicine into your shoulder. These help relieve pain and swelling for a time.    Physical therapy and exercises.  These help improve strength, flexibility, and range of motion in your arm and shoulder.     Surgery. You may need surgery if your tendon is completely torn or if other treatments don t relieve your symptoms. Different options are available. In many cases, the damaged tendon is repaired. It is then reattached to your arm bone.  Possible complications    If a partial tear isn t given time to heal, it may get larger or tear completely. You  may then need more treatment.    Even with treatment, a partial or complete tear may sometimes have trouble healing. The problem may become long-term (chronic). This can cause ongoing pain, weakness, and limited movement of your arm and shoulder.     When to call your healthcare provider  Call your healthcare provider right away if you have any of these:    Fever of 100.4 F (38 C) or higher, or as directed    Symptoms that don t get better with treatment, or get worse    After surgery, symptoms at the incision sites such as redness, warmth, swelling, bleeding, or drainage    New symptoms   Date Last Reviewed: 3/10/2016    [id number] [employer]. [address] [place] [address]. All rights reserved. This information is not intended as a substitute for professional medical care. Always follow your healthcare professional's instructions.        Exercises for Shoulder Flexibility: Wall Walk    Improving your flexibility can reduce pain. Stretching exercises also can help increase your range of pain-free motion. Breathe normally when you exercise. Use smooth, fluid movements.  Note: Follow any special instructions you are given. If you feel pain, stop the exercise. If the pain continues after stopping, call your healthcare provider:    Stand with your shoulder about 2 feet from the wall.    Raise your arm to shoulder level and gently  walk  your fingers up the wall as high as you can.    Hold for a few seconds. Then walk your fingers back down.    Repeat 3 times. Move closer to the wall as you repeat.    Build up to holding each stretch for 30 seconds.  Caution: Do this stretch only if your healthcare provider recommends it. Don t do it when you are first injured.       Date Last Reviewed: 8/16/2015 2000-2017 [employer]. [address] [place] [address]. All rights reserved. This information is not intended as a substitute for professional medical care. Always follow your  healthcare professional's instructions.        Shoulder Flexion (Flexibility)    1. Sit in a chair sideways next to a table. Lay your right arm on the table, pointed forward.  2. Slowly lean forward.  Slide your arm forward on the table. Feel the stretch in your right shoulder.  3. Hold for 5 seconds. Slowly sit back up.  4. Repeat 5 times.  5. Repeat this exercise 3 times a day, or as instructed.  Date Last Reviewed: 3/10/2016    2670-9182 [employer]. [address] [place] [address]. All rights reserved. This information is not intended as a substitute for professional medical care. Always follow your healthcare professional's instructions.        Shoulder Exercises: Side Raise    This exercise stretches and strengthens your shoulders. Before starting, read through all the instructions. During the exercise, breathe normally and use smooth movements. Stop if you feel any pain. If pain persists, call your healthcare provider.    Stand straight, holding a ____ pound weight in each hand, arms at sides, feet shoulder-width apart.    Slowly extend your arms up and out until weights are at shoulder level. Slowly return to starting position.    Repeat ____ times. Do ____ sets ____ times a day.     CAUTION: Don t swing the weights or raise weights above shoulder level.   Date Last Reviewed: 8/16/2015 2000-2017 [employer]. [address] [place] [address]. All rights reserved. This information is not intended as a substitute for professional medical care. Always follow your healthcare professional's instructions.        Shoulder External Rotation, Side-Lying (Strength)    This exercise is for your right shoulder joint. Switch sides if the problem is in your left shoulder joint.  6. Lie on your left side with your head supported by a pillow. Place a small rolled-up towel under your right elbow.  7. Hold a hand weight in your right hand. Your healthcare provider will tell you  what size hand weight to use.  8. Bend your arm in front of you at a right angle. Then bend it down and bring the hand weight to the floor. Rest your forearm on your stomach.  9. Keep your elbow on the towel and slowly lift the hand weight up in front of you. Stop when the weight is raised slightly higher than your elbow.  10. Lower the weight back down.  11. Repeat 5 to 15 times, or as instructed.  Date Last Reviewed: 3/10/2016    8862-3481 [employer]. [address] [place] [address], [place] [address]. All rights reserved. This information is not intended as a substitute for professional medical care. Always follow your healthcare professional's instructions.

## 2017-11-29 NOTE — MR AVS SNAPSHOT
After Visit Summary   11/29/2017    Allyn Pascal    MRN: [id number]           Patient Information     Date Of Birth          1955        Visit Information        Provider Department      11/29/2017 10:00 AM Rasheeda Seaman MD [facility] [place]      Rotator Cuff Tear  The rotator cuff is a group of muscles and tendons that surround the shoulder joint. These muscles and tendons hold the arm in its joint. They also help the shoulder to rotate. The rotator cuff can be torn from overuse or injury. Gradual wear and tear can lead to inflammation of these tendons. This can progress to gradual or sudden tears.  Symptoms of a torn rotator cuff include:    Shoulder pain that gets worse when you raise your arm overhead    Weakness of the shoulder muscles with overhead activity    Popping and clicking when you move your shoulder    Shoulder pain that wakes you up at night when sleeping on the hurt shoulder  Diagnosis is made by an MRI or arthroscopy. This is a surgical procedure to look inside the joint through a small tube. Partial rotator cuff tears can be treated by first resting, then strengthening the rotator cuff muscles.  Anti-inflammatory medicines, such as ibuprofen or naproxen, are useful. A limited number of steroid injections can be given. Surgery may be recommended for complete tears and partial tears that do not respond to medical treatment.  Home care    Avoid activities that make your pain worse. This includes overhead activities, doing the same motion over and over, and heavy lifting.    You may use over-the-counter pain medicines to control pain, unless another medicine was prescribed. If you have chronic liver or kidney disease or ever had a stomach ulcer or GI bleeding, talk with your healthcare provider before using these medicines.    If you were given a sling, use it for comfort. After your pain decreases, don t keep your arm in the sling all the  time. Take your arm out several times a day and move the shoulder joint, as you are able.    Your healthcare provider may recommend gentle pendulum exercises. Stand or sit with your arm vertical and close to your side. Relax your shoulder muscles and gently swing the arm forward and back, side to side, and in small circles for about 5 minutes. Do this once or twice a day. There should be only slight pain with this exercise.    You may benefit from physical therapy or a home exercise program to strengthen your shoulder muscles. This will also increase your pain-free range of motion. Applying heat prior to exercises can help prepare the muscles and joint for activity. Talk to your healthcare provider about what is best for your condition.  Follow-up care  Follow up with your healthcare provider, or as advised.  When to seek medical advice  Call your healthcare provider right away if any of the following occur:    Increasing shoulder pain    Rapid swelling in the involved shoulder or arm    Numbness, tingling, or pain radiating down the arm to the hand    Loss of strength in the affected arm  Date Last Reviewed: 11/23/2015 2000-2017 [employer]. [address] [place] [address]. All rights reserved. This information is not intended as a substitute for professional medical care. Always follow your healthcare professional's instructions.        Understanding a Rotator Cuff Tendon Tear    The rotator cuff is a group of 4 muscles and their tendons in the shoulder. The muscles are located in the front, back, and top of the shoulder joint. They each have a strong band of tissue (tendon) that attaches to the top of the upper arm bone. This helps keep the arm bone firmly in place in the socket of the shoulder joint. The muscles and tendons of the rotator cuff also help the shoulder joint with certain movements. These include reaching the arm over the head and rotating the arm.  Any one of the rotator  cuff tendons can fray or tear from causes such as injury and overuse. A tear may be partial, with some of the tendon still intact. Or it may be a complete tear, with the tendon fully torn. Both types can cause pain and weakness, and limit arm and shoulder movement. A rotator cuff tear often needs treatment to heal properly.  Causes of a rotator cuff tendon tear  Causes can include:    Wear and tear of the tendons from aging or normal use over time    Overuse of the tendons from sports or work activities, especially those that involve repeated overhead movements    Injury to the tendons from a fall or other accident  Symptoms of a rotator cuff tendon tear  Some people with a rotator cuff tendon tear have few or no symptoms. Others may have symptoms that range from mild to severe. Possible symptoms include:    Pain in your shoulder, which may be worse with overhead movements or at night from lying on the affected side    Weakness in your arm and shoulder    Trouble lifting your arm up or rotating your arm    Clicking or crackling sounds when moving or using your arm and shoulder  Treating a rotator cuff tendon tear  Treatment for a rotator cuff tendon tear depends on several factors. These include the severity of the tear and your symptoms. Options may include:    Resting your arm and shoulder. This involves limiting certain movements, such as reaching above your head or lifting your arm up. These can slow healing and worsen symptoms. You may also need to avoid certain sports and types of work for a time.    Cold packs or heat packs. These help reduce pain and swelling.    Prescription or over-the-counter pain medicines. These help reduce pain and swelling.    Injections of medicine into your shoulder. These help relieve pain and swelling for a time.    Physical therapy and exercises.  These help improve strength, flexibility, and range of motion in your arm and shoulder.     Surgery. You may need surgery if your  tendon is completely torn or if other treatments don t relieve your symptoms. Different options are available. In many cases, the damaged tendon is repaired. It is then reattached to your arm bone.  Possible complications    If a partial tear isn t given time to heal, it may get larger or tear completely. You may then need more treatment.    Even with treatment, a partial or complete tear may sometimes have trouble healing. The problem may become long-term (chronic). This can cause ongoing pain, weakness, and limited movement of your arm and shoulder.     When to call your healthcare provider  Call your healthcare provider right away if you have any of these:    Fever of 100.4 F (38 C) or higher, or as directed    Symptoms that don t get better with treatment, or get worse    After surgery, symptoms at the incision sites such as redness, warmth, swelling, bleeding, or drainage    New symptoms   Date Last Reviewed: 3/10/2016    6994-2681 [employer]. [address] [place] [address]. All rights reserved. This information is not intended as a substitute for professional medical care. Always follow your healthcare professional's instructions.        Exercises for Shoulder Flexibility: Wall Walk    Improving your flexibility can reduce pain. Stretching exercises also can help increase your range of pain-free motion. Breathe normally when you exercise. Use smooth, fluid movements.  Note: Follow any special instructions you are given. If you feel pain, stop the exercise. If the pain continues after stopping, call your healthcare provider:    Stand with your shoulder about 2 feet from the wall.    Raise your arm to shoulder level and gently  walk  your fingers up the wall as high as you can.    Hold for a few seconds. Then walk your fingers back down.    Repeat 3 times. Move closer to the wall as you repeat.    Build up to holding each stretch for 30 seconds.  Caution: Do this stretch only if your  healthcare provider recommends it. Don t do it when you are first injured.       Date Last Reviewed: 8/16/2015 2000-2017 [employer] [address] [place] [address]. All rights reserved. This information is not intended as a substitute for professional medical care. Always follow your healthcare professional's instructions.        Shoulder Flexion (Flexibility)    1. Sit in a chair sideways next to a table. Lay your right arm on the table, pointed forward.  2. Slowly lean forward.  Slide your arm forward on the table. Feel the stretch in your right shoulder.  3. Hold for 5 seconds. Slowly sit back up.  4. Repeat 5 times.  5. Repeat this exercise 3 times a day, or as instructed.  Date Last Reviewed: 3/10/2016    8715-9261 [employer] [address] [place] [address]. All rights reserved. This information is not intended as a substitute for professional medical care. Always follow your healthcare professional's instructions.        Shoulder Exercises: Side Raise    This exercise stretches and strengthens your shoulders. Before starting, read through all the instructions. During the exercise, breathe normally and use smooth movements. Stop if you feel any pain. If pain persists, call your healthcare provider.    Stand straight, holding a ____ pound weight in each hand, arms at sides, feet shoulder-width apart.    Slowly extend your arms up and out until weights are at shoulder level. Slowly return to starting position.    Repeat ____ times. Do ____ sets ____ times a day.     CAUTION: Don t swing the weights or raise weights above shoulder level.   Date Last Reviewed: 8/16/2015 2000-2017 [employer] [address] [place] [address]. All rights reserved. This information is not intended as a substitute for professional medical care. Always follow your healthcare professional's instructions.        Shoulder External Rotation,  Side-Lying (Strength)    This exercise is for your right shoulder joint. Switch sides if the problem is in your left shoulder joint.  6. Lie on your left side with your head supported by a pillow. Place a small rolled-up towel under your right elbow.  7. Hold a hand weight in your right hand. Your healthcare provider will tell you what size hand weight to use.  8. Bend your arm in front of you at a right angle. Then bend it down and bring the hand weight to the floor. Rest your forearm on your stomach.  9. Keep your elbow on the towel and slowly lift the hand weight up in front of you. Stop when the weight is raised slightly higher than your elbow.  10. Lower the weight back down.  11. Repeat 5 to 15 times, or as instructed.  Date Last Reviewed: 3/10/2016    4476-3191 [employer]. [address] [place] [address]. All rights reserved. This information is not intended as a substitute for professional medical care. Always follow your healthcare professional's instructions.                Follow-ups after your visit        Who to contact     If you have questions or need follow up information about today's clinic visit or your schedule please contact [facility] directly at [phone].  Normal or non-critical lab and imaging results will be communicated to you by MyChart, letter or phone within 4 business days after the clinic has received the results. If you do not hear from us within 7 days, please contact the clinic through MyChart or phone. If you have a critical or abnormal lab result, we will notify you by phone as soon as possible.  Submit refill requests through [employer] or call your pharmacy and they will forward the refill request to us. Please allow 3 business days for your refill to be completed.          Additional Information About Your Visit        Care EveryWhere ID     This is your Care EveryWhere ID. This could be used by other organizations to access your  "[place] medical records  [id number]        Your Vitals Were     Temperature Height BMI (Body Mass Index)             96.1  F (35.6  C) 1.549 m (5' 1\") 29.48 kg/m2          Blood Pressure from Last 3 Encounters:   11/14/17 138/84   01/22/15 132/80   01/21/09 130/90    Weight from Last 3 Encounters:   11/29/17 70.8 kg (156 lb)   11/22/17 70.8 kg (156 lb)   11/14/17 70.8 kg (156 lb)              Today, you had the following     No orders found for display       Primary Care Provider Office Phone # Fax #    Criss Odom [phone]       [address] [place] [address]        Equal Access to Services     WILLIAM BUTLER : Carrie lezama Sokayleen, waaxda luqadaha, qaybta kaalmada adeegyada, rober dugan . So [facility] [phone].    ATENCIÓN: Si habla español, tiene a bess disposición servicios gratuitos de asistencia lingüística. Llame al [phone].    We comply with applicable federal civil rights laws and [place] laws. We do not discriminate on the basis of race, color, national origin, age, disability, sex, sexual orientation, or gender identity.            Thank you!     Thank you for choosing [facility]  for your care. Our goal is always to provide you with excellent care. Hearing back from our patients is one way we can continue to improve our services. Please take a few minutes to complete the written survey that you may receive in the mail after your visit with us. Thank you!             Your Updated Medication List - Protect others around you: Learn how to safely use, store and throw away your medicines at www.disposemymeds.org.      Notice  As of 11/29/2017 10:37 AM    You have not been prescribed any medications.      "

## 2017-11-29 NOTE — PROGRESS NOTES
"Office Visit-Follow up    Chief Complaint: Allyn Pascal is a 61 year old female who is being seen for   Chief Complaint   Patient presents with     Results     MRI results of rt shoulder done on 11/27/17       History of Present Illness:   Difficulty driving and moving the steering wheel with the right arm  Otherwise pain 3/10, not interested in surgery      REVIEW OF SYSTEMS  General: negative for, night sweats, dizziness, fatigue  Resp: No shortness of breath and no cough  CV: negative for chest pain, syncope or near-syncope  GI: negative for nausea, vomiting and diarrhea  : negative for dysuria and hematuria  Musculoskeletal: as above  Neurologic: negative for syncope   Hematologic: negative for bleeding disorder    Physical Exam:  Vitals: Temp 96.1  F (35.6  C)  Ht 1.549 m (5' 1\")  Wt 70.8 kg (156 lb)  BMI 29.48 kg/m2  BMI= Body mass index is 29.48 kg/(m^2).  Constitutional: healthy, alert and no acute distress   Psychiatric: mentation appears normal and affect normal/bright  NEURO: no focal deficits  RESP: Normal with easy respirations and no use of accessory muscles to breathe, no audible wheezing or retractions  CV: No peripheral edema  SKIN: No erythema, rashes, excoriation, or breakdown. No evidence of infection.   JOINT/EXTREMITIES:right shoulder - mild stiffness,  with coaxing, ER full, weak abduction and ER, good strength in IR  GAIT: non-antalgic            Diagnostic Modalities:  right shoulder MRI:  Rotator cuff full thickness tears: Supraspinatus, Infraspinatus and large tear.  Independent visualization of the images was performed.      Impression: right Rotator cuff tear-full thickness: Supraspinatus, Infraspinatus and large tear    Plan:  All of the above pertinent physical exam and imaging modalities findings was reviewed with Allyn and her daughter.                                          CONSERVATIVE CARE:  I recommend conservative care for the patient to include NSAIDs, Tylenol, " focused self directed physical therapy, formal physical therapy. Today I provided or dispensed physical therapy, info and hep.                                                FUTURE PLAN:  On their return if they still have symptoms we will consider surgery, NSAID's, injection of steroids.        Return to clinic PRN, or sooner as needed for changes.  Re-x-ray on return: No    Rasheeda Seaman M.D.

## 2017-11-29 NOTE — LETTER
"    11/29/2017         RE: Allyn Pascal  [address] [place] [id number]        Dear Colleague,    Thank you for referring your patient, Allyn Pascal, to the [facility]. Please see a copy of my visit note below.    Office Visit-Follow up    Chief Complaint: Allyn Pascal is a 61 year old female who is being seen for   Chief Complaint   Patient presents with     Results     MRI results of rt shoulder done on 11/27/17       History of Present Illness:   Difficulty driving and moving the steering wheel with the right arm  Otherwise pain 3/10, not interested in surgery      REVIEW OF SYSTEMS  General: negative for, night sweats, dizziness, fatigue  Resp: No shortness of breath and no cough  CV: negative for chest pain, syncope or near-syncope  GI: negative for nausea, vomiting and diarrhea  : negative for dysuria and hematuria  Musculoskeletal: as above  Neurologic: negative for syncope   Hematologic: negative for bleeding disorder    Physical Exam:  Vitals: Temp 96.1  F (35.6  C)  Ht 1.549 m (5' 1\")  Wt 70.8 kg (156 lb)  BMI 29.48 kg/m2  BMI= Body mass index is 29.48 kg/(m^2).  Constitutional: healthy, alert and no acute distress   Psychiatric: mentation appears normal and affect normal/bright  NEURO: no focal deficits  RESP: Normal with easy respirations and no use of accessory muscles to breathe, no audible wheezing or retractions  CV: No peripheral edema  SKIN: No erythema, rashes, excoriation, or breakdown. No evidence of infection.   JOINT/EXTREMITIES:right shoulder - mild stiffness,  with coaxing, ER full, weak abduction and ER, good strength in IR  GAIT: non-antalgic            Diagnostic Modalities:  right shoulder MRI:  Rotator cuff full thickness tears: Supraspinatus, Infraspinatus and large tear.  Independent visualization of the images was performed.      Impression: right Rotator cuff tear-full thickness: Supraspinatus, Infraspinatus and large tear    Plan:  All of the " above pertinent physical exam and imaging modalities findings was reviewed with Allyn and her daughter.                                          CONSERVATIVE CARE:  I recommend conservative care for the patient to include NSAIDs, Tylenol, focused self directed physical therapy, formal physical therapy. Today I provided or dispensed physical therapy, info and hep.                                                FUTURE PLAN:  On their return if they still have symptoms we will consider surgery, NSAID's, injection of steroids.        Return to clinic PRN, or sooner as needed for changes.  Re-x-ray on return: No    Rasheeda Seaman M.D.          Again, thank you for allowing me to participate in the care of your patient.        Sincerely,        Rasheeda eSaman MD

## 2017-11-29 NOTE — NURSING NOTE
"Chief Complaint   Patient presents with     Results     MRI results of rt shoulder done on 11/27/17       Initial Temp 96.1  F (35.6  C)  Ht 1.549 m (5' 1\")  Wt 70.8 kg (156 lb)  BMI 29.48 kg/m2 Estimated body mass index is 29.48 kg/(m^2) as calculated from the following:    Height as of this encounter: 1.549 m (5' 1\").    Weight as of this encounter: 70.8 kg (156 lb).  Medication Reconciliation: complete    BP completed using cuff size: NA (Not Taken)    Harmony Landeros MA      "

## 2017-12-06 ENCOUNTER — THERAPY VISIT (OUTPATIENT)
Dept: PHYSICAL THERAPY | Facility: CLINIC | Age: 62
End: 2017-12-06
Payer: COMMERCIAL

## 2017-12-06 DIAGNOSIS — M25.511 ACUTE PAIN OF RIGHT SHOULDER: Primary | ICD-10-CM

## 2017-12-06 PROCEDURE — 97110 THERAPEUTIC EXERCISES: CPT | Mod: GP | Performed by: PHYSICAL THERAPIST

## 2017-12-06 PROCEDURE — 97161 PT EVAL LOW COMPLEX 20 MIN: CPT | Mod: GP | Performed by: PHYSICAL THERAPIST

## 2017-12-06 NOTE — MR AVS SNAPSHOT
After Visit Summary   12/6/2017    Allyn Pascal    MRN: [id number]           Patient Information     Date Of Birth          1955        Visit Information        Provider Department      12/6/2017 2:00 PM Jeremy Sarah, PT [facility] Physical Therapy        Today's Diagnoses     Acute pain of right shoulder    -  1       Follow-ups after your visit        Your next 10 appointments already scheduled     Dec 13, 2017  4:00 PM CST   JANELLE Extremity with Jeremy Sarah PT   [facility] Physical Therapy ([facility]  )    [address]  [facility] [address]   [phone]            Dec 20, 2017  2:40 PM CST   JANELLE Extremity with Jeremy Sarah PT   [facility] ([facility]  )    [address]  [place] [address]   [phone]            Dec 27, 2017 12:10 PM CST   JANELLE Extremity with Jeremy Sarah PT   [facility] ([facility]  )    [address]  [facility] [address]   [phone]              Who to contact     If you have questions or need follow up information about today's clinic visit or your schedule please contact [facility] THERAP[facility] directly at [phone].  Normal or non-critical lab and imaging results will be communicated to you by MyChart, letter or phone within 4 business days after the clinic has received the results. If you do not hear from us within 7 days, please contact the clinic through MyChart or phone. If you have a critical or abnormal lab result, we will notify you by phone as soon as possible.  Submit refill requests through [employer] or call your pharmacy and they will forward the refill request to us. Please allow 3 business days for your refill to be completed.          Additional Information About Your Visit         Care EveryWhere ID     This is your Care EveryWhere ID. This could be used by other organizations to access your [place] medical records  [id number]         Blood Pressure from Last 3 Encounters:   11/14/17 138/84   01/22/15 132/80   01/21/09 130/90    Weight from Last 3 Encounters:   11/29/17 70.8 kg (156 lb)   11/22/17 70.8 kg (156 lb)   11/14/17 70.8 kg (156 lb)              We Performed the Following     HC PT EVAL, LOW COMPLEXITY     JANELLE CERT REPORT     JANELLE INITIAL EVAL REPORT     THERAPEUTIC EXERCISES        Primary Care Provider Office Phone # Fax #    Criss Wanda Odom [phone]       [address] [place] [address]        Equal Access to Services     WILLIAM BUTLER : Hadii nicholas montoyao Sokayleen, waaxda luqadaha, qaybta kaalmada adeegyada, rober dugan . So [facility] [phone].    ATENCIÓN: Si habla español, tiene a ebss disposición servicios gratuitos de asistencia lingüística. Llame al [phone].    We comply with applicable federal civil rights laws and [place] laws. We do not discriminate on the basis of race, color, national origin, age, disability, sex, sexual orientation, or gender identity.            Thank you!     Thank you for choosing [facility]  for your care. Our goal is always to provide you with excellent care. Hearing back from our patients is one way we can continue to improve our services. Please take a few minutes to complete the written survey that you may receive in the mail after your visit with us. Thank you!             Your Updated Medication List - Protect others around you: Learn how to safely use, store and throw away your medicines at www.disposemymeds.org.      Notice  As of 12/6/2017  3:07 PM    You have not been prescribed any medications.

## 2017-12-06 NOTE — PROGRESS NOTES
[facility] Initial Evaluation      Subjective:    Patient is a 61 year old female presenting with rehab right shoulder hpi.     Condition occurred with:  A fall (fell down stairs and landed on elbow which mirian her shoulder. ).  Condition occurred: at home.  This is a new condition  11/5/17 date of fall  .    Patient reports pain:  Anterior and lateral.  Radiates to:  Elbow and upper arm.   and is intermittent and reported as 3/10.  Associated symptoms:  Loss of motion/stiffness and loss of strength. Pain is worse in the P.M..  Symptoms are exacerbated by using arm overhead, using arm at shoulder level and lifting Relieved by: ibuprofen,   Since onset symptoms are gradually improving.  Special tests:  MRI (full thickness of your supraspinatus tendon with subscapuris and bicep tendosis).      General health as reported by patient is good.  Pertinent medical history includes:  Heart problems.  Medical allergies: yes (sulf).  Other surgeries include:  Other (tumor stomach).  Current medications:  None as reported by the patient.  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  Primary job tasks include:  Lifting and repetitive tasks (housework).    Barriers include:  None as reported by the patient.    Red flags:  None as reported by the patient.                        Objective:    System                   Shoulder Evaluation:  ROM:  AROM:    Flexion:  Left:  161    Right:  89  Extension: Left: 70Right: 60  Abduction:  Left: 155   Right:  95      External Rotation:  Left:  92    Right:  62            Extension/Internal Rotation:  Left:  T10    Right:  T12    PROM:    Flexion:  Right: 135      Abduction:  Right:  128    Internal Rotation:  Right:  58  External Rotation:  Right:  71                    Strength:    Flexion: Left:5/5 Strong/pain free    Pain:    Right: 3-/5  Weak/painful     Pain:   Extension:  Left: 5/5  Strong/pain free    Pain:    Right:  5/5    Strong/pain free  Pain:  Abduction:  Left: 5/5  Strong/pain free  Pain:    Right: 3-/5   Weak/painful    Pain:  Adduction:  Left: 5/5   Strong/pain free   Pain:    Right: 5/5   Strong/pain free    Pain:  Internal Rotation:  Left:5/5   Strong/pain free    Pain:    Right: 5/5    Strong/painful    Pain:+  External Rotation:   Left:5/5   Strong/pain free    Pain:   Right:3-/5   Weak/painful    Pain:        Elbow Flexion:  Left:5/5   Strong/pain free    Pain:    Right:5/5   Strong/pain free    Pain:  Elbow Extension:  Left:5/5   Strong/pain free    Pain:    Right:5/5   Strong/pain free    Pain:    Special Tests:      Right shoulder positive for the following special tests:Rotator cuff tear  Palpation:      Right shoulder tenderness present at: Supraspinatus  Right shoulder tenderness not present at:Biceps; Infraspinatus; Teres Minor; Subscapularis or Bicipital Groove  Mobility Tests:    Glenohumeral anterior right:  Hypomobile  Glenohumeral posterior right:  Hypomobile                                             General     ROS    Assessment/Plan:      Patient is a 61 year old female with right side shoulder complaints.    Patient has the following significant findings with corresponding treatment plan.                Diagnosis 1:  Right shoulder pain / rotator cuff tear  Pain -  hot/cold therapy, manual therapy, self management, education, directional preference exercise and home program  Decreased ROM/flexibility - manual therapy, therapeutic exercise, therapeutic activity and home program  Decreased joint mobility - manual therapy, therapeutic exercise, therapeutic activity and home program  Decreased strength - therapeutic exercise, therapeutic activities and home program  Decreased function - therapeutic activities and home program    Therapy Evaluation Codes:   1) History comprised of:   Personal factors that impact the plan of care:      None.    Comorbidity factors that impact the plan of care are:       Heart problems.     Medications impacting care: None.  2) Examination of Body Systems comprised of:   Body structures and functions that impact the plan of care:      Shoulder.   Activity limitations that impact the plan of care are:      Bathing, Cooking and Lifting.  3) Clinical presentation characteristics are:   Stable/Uncomplicated.  4) Decision-Making    Low complexity using standardized patient assessment instrument and/or measureable assessment of functional outcome.  Cumulative Therapy Evaluation is: Low complexity.    Previous and current functional limitations:  (See Goal Flow Sheet for this information)    Short term and Long term goals: (See Goal Flow Sheet for this information)     Communication ability:  Patient appears to be able to clearly communicate and understand verbal and written communication and follow directions correctly.  Treatment Explanation - The following has been discussed with the patient:   RX ordered/plan of care  Anticipated outcomes  Possible risks and side effects  This patient would benefit from PT intervention to resume normal activities.   Rehab potential is good.    Frequency:  1 X week, once daily  Duration:  for 8 weeks  Discharge Plan:  Achieve all LTG.  Independent in home treatment program.  Reach maximal therapeutic benefit.    Please refer to the daily flowsheet for treatment today, total treatment time and time spent performing 1:1 timed codes.

## 2017-12-06 NOTE — LETTER
[facility]  [address]  [facility] [id number] [phone]    2017    Re: Allyn Pascal   :   1955  MRN:  [id number]   REFERRING PHYSICIAN:   Rasheeda Seaman    [facility]    Date of Initial Evaluation:  ***  Visits:  Rxs Used: 1  Reason for Referral:  Acute pain of right shoulder    EVALUATION SUMMARY    [facility]      Subjective:    Patient is a 61 year old female presenting with rehab right shoulder hpi.     Condition occurred with:  A fall (fell down stairs and landed on elbow which mirian her shoulder. ).  Condition occurred: at home.  This is a new condition  17 date of fall  .    Patient reports pain:  Anterior and lateral.  Radiates to:  Elbow and upper arm.   and is intermittent and reported as 3/10.  Associated symptoms:  Loss of motion/stiffness and loss of strength. Pain is worse in the P.M..  Symptoms are exacerbated by using arm overhead, using arm at shoulder level and lifting Relieved by: ibuprofen,   Since onset symptoms are gradually improving.  Special tests:  MRI (full thickness of your supraspinatus tendon with subscapuris and bicep tendosis).      General health as reported by patient is good.  Pertinent medical history includes:  Heart problems.  Medical allergies: yes (sulf).  Other surgeries include:  Other (tumor stomach).  Current medications:  None as reported by the patient.  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  Primary job tasks include:  Lifting and repetitive tasks (housework).    Barriers include:  None as reported by the patient.    Red flags:  None as reported by the patient.                        Objective:    System                   Shoulder Evaluation:  ROM:  AROM:    Flexion:  Left:  161    Right:  89  Extension: Left: 70Right:  60  Abduction:  Left: 155   Right:  95      External Rotation:  Left:  92    Right:  62            Extension/Internal Rotation:  Left:  T10    Right:  T12    PROM:    Flexion:  Right: 135      Abduction:  Right:  128    Internal Rotation:  Right:  58  External Rotation:  Right:  71                    Strength:    Flexion: Left:5/5 Strong/pain free    Pain:    Right: 3-/5  Weak/painful     Pain:   Extension:  Left: 5/5  Strong/pain free    Pain:    Right: 5/5    Strong/pain free  Pain:  Abduction:  Left: 5/5  Strong/pain free  Pain:    Right: 3-/5   Weak/painful    Pain:  Adduction:  Left: 5/5   Strong/pain free   Pain:    Right: 5/5   Strong/pain free    Pain:  Internal Rotation:  Left:5/5   Strong/pain free    Pain:    Right: 5/5    Strong/painful    Pain:+  External Rotation:   Left:5/5   Strong/pain free    Pain:   Right:3-/5   Weak/painful    Pain:        Elbow Flexion:  Left:5/5   Strong/pain free    Pain:    Right:5/5   Strong/pain free    Pain:  Elbow Extension:  Left:5/5   Strong/pain free    Pain:    Right:5/5   Strong/pain free    Pain:    Special Tests:      Right shoulder positive for the following special tests:Rotator cuff tear  Palpation:      Right shoulder tenderness present at: Supraspinatus  Right shoulder tenderness not present at:Biceps; Infraspinatus; Teres Minor; Subscapularis or Bicipital Groove  Mobility Tests:    Glenohumeral anterior right:  Hypomobile  Glenohumeral posterior right:  Hypomobile                                             General     ROS    Assessment/Plan:      Patient is a 61 year old female with right side shoulder complaints.    Patient has the following significant findings with corresponding treatment plan.                Diagnosis 1:  Right shoulder pain / rotator cuff tear  Pain -  hot/cold therapy, manual therapy, self management, education, directional preference exercise and home program  Decreased ROM/flexibility - manual therapy, therapeutic exercise,  therapeutic activity and home program  Decreased joint mobility - manual therapy, therapeutic exercise, therapeutic activity and home program  Decreased strength - therapeutic exercise, therapeutic activities and home program  Decreased function - therapeutic activities and home program    Therapy Evaluation Codes:   1) History comprised of:   Personal factors that impact the plan of care:      None.    Comorbidity factors that impact the plan of care are:      Heart problems.     Medications impacting care: None.  2) Examination of Body Systems comprised of:   Body structures and functions that impact the plan of care:      Shoulder.   Activity limitations that impact the plan of care are:      Bathing, Cooking and Lifting.  3) Clinical presentation characteristics are:   Stable/Uncomplicated.  4) Decision-Making    Low complexity using standardized patient assessment instrument and/or measureable assessment of functional outcome.  Cumulative Therapy Evaluation is: Low complexity.    Previous and current functional limitations:  (See Goal Flow Sheet for this information)    Short term and Long term goals: (See Goal Flow Sheet for this information)     Communication ability:  Patient appears to be able to clearly communicate and understand verbal and written communication and follow directions correctly.  Treatment Explanation - The following has been discussed with the patient:   RX ordered/plan of care  Anticipated outcomes  Possible risks and side effects  This patient would benefit from PT intervention to resume normal activities.   Rehab potential is good.    Frequency:  1 X week, once daily  Duration:  for 8 weeks  Discharge Plan:  Achieve all LTG.  Independent in home treatment program.  Reach maximal therapeutic benefit.            Thank you for your referral.    INQUIRIES  Therapist:    [facility]  [address]  [facility] [address]  Phone:  [phone]  Fax: [phone]

## 2017-12-13 ENCOUNTER — TELEPHONE (OUTPATIENT)
Dept: FAMILY MEDICINE | Facility: OTHER | Age: 62
End: 2017-12-13

## 2017-12-13 ENCOUNTER — THERAPY VISIT (OUTPATIENT)
Dept: PHYSICAL THERAPY | Facility: CLINIC | Age: 62
End: 2017-12-13
Payer: COMMERCIAL

## 2017-12-13 DIAGNOSIS — M25.511 ACUTE PAIN OF RIGHT SHOULDER: ICD-10-CM

## 2017-12-13 DIAGNOSIS — Z12.39 BREAST CANCER SCREENING: Primary | ICD-10-CM

## 2017-12-13 PROCEDURE — 97110 THERAPEUTIC EXERCISES: CPT | Mod: GP | Performed by: PHYSICAL THERAPIST

## 2017-12-13 NOTE — TELEPHONE ENCOUNTER
Panel management:    Please call patient and assist in scheduling mammogram. Order placed in Epic.     Leeanne Parrish [facility]

## 2017-12-15 ENCOUNTER — DOCUMENTATION ONLY (OUTPATIENT)
Dept: OTHER | Facility: CLINIC | Age: 62
End: 2017-12-15

## 2017-12-15 PROBLEM — Z71.89 ADVANCED DIRECTIVES, COUNSELING/DISCUSSION: Chronic | Status: ACTIVE | Noted: 2017-12-15

## 2017-12-20 ENCOUNTER — THERAPY VISIT (OUTPATIENT)
Dept: PHYSICAL THERAPY | Facility: CLINIC | Age: 62
End: 2017-12-20
Payer: COMMERCIAL

## 2017-12-20 DIAGNOSIS — M25.511 ACUTE PAIN OF RIGHT SHOULDER: ICD-10-CM

## 2017-12-20 PROCEDURE — 97110 THERAPEUTIC EXERCISES: CPT | Mod: GP | Performed by: PHYSICAL THERAPIST

## 2017-12-27 ENCOUNTER — THERAPY VISIT (OUTPATIENT)
Dept: PHYSICAL THERAPY | Facility: CLINIC | Age: 62
End: 2017-12-27
Payer: COMMERCIAL

## 2017-12-27 DIAGNOSIS — M25.511 ACUTE PAIN OF RIGHT SHOULDER: ICD-10-CM

## 2017-12-27 PROCEDURE — 97110 THERAPEUTIC EXERCISES: CPT | Mod: GP | Performed by: PHYSICAL THERAPIST

## 2018-01-03 ENCOUNTER — THERAPY VISIT (OUTPATIENT)
Dept: PHYSICAL THERAPY | Facility: CLINIC | Age: 63
End: 2018-01-03
Payer: COMMERCIAL

## 2018-01-03 DIAGNOSIS — M25.511 ACUTE PAIN OF RIGHT SHOULDER: ICD-10-CM

## 2018-01-03 PROCEDURE — 97112 NEUROMUSCULAR REEDUCATION: CPT | Mod: GP | Performed by: PHYSICAL THERAPIST

## 2018-01-03 PROCEDURE — 97110 THERAPEUTIC EXERCISES: CPT | Mod: GP | Performed by: PHYSICAL THERAPIST

## 2018-01-10 ENCOUNTER — THERAPY VISIT (OUTPATIENT)
Dept: PHYSICAL THERAPY | Facility: CLINIC | Age: 63
End: 2018-01-10
Payer: COMMERCIAL

## 2018-01-10 DIAGNOSIS — M25.511 ACUTE PAIN OF RIGHT SHOULDER: ICD-10-CM

## 2018-01-10 PROCEDURE — 97110 THERAPEUTIC EXERCISES: CPT | Mod: GP | Performed by: PHYSICAL THERAPIST

## 2018-01-10 PROCEDURE — 97112 NEUROMUSCULAR REEDUCATION: CPT | Mod: GP | Performed by: PHYSICAL THERAPIST

## 2018-01-17 ENCOUNTER — THERAPY VISIT (OUTPATIENT)
Dept: PHYSICAL THERAPY | Facility: CLINIC | Age: 63
End: 2018-01-17
Payer: COMMERCIAL

## 2018-01-17 DIAGNOSIS — M25.511 ACUTE PAIN OF RIGHT SHOULDER: ICD-10-CM

## 2018-01-17 PROCEDURE — 97110 THERAPEUTIC EXERCISES: CPT | Mod: GP | Performed by: PHYSICAL THERAPIST

## 2018-01-17 PROCEDURE — 97112 NEUROMUSCULAR REEDUCATION: CPT | Mod: GP | Performed by: PHYSICAL THERAPIST

## 2018-01-24 ENCOUNTER — THERAPY VISIT (OUTPATIENT)
Dept: PHYSICAL THERAPY | Facility: CLINIC | Age: 63
End: 2018-01-24
Payer: COMMERCIAL

## 2018-01-24 DIAGNOSIS — M25.511 ACUTE PAIN OF RIGHT SHOULDER: ICD-10-CM

## 2018-01-24 PROCEDURE — 97112 NEUROMUSCULAR REEDUCATION: CPT | Mod: GP | Performed by: PHYSICAL THERAPIST

## 2018-01-24 PROCEDURE — 97110 THERAPEUTIC EXERCISES: CPT | Mod: GP | Performed by: PHYSICAL THERAPIST

## 2018-01-24 NOTE — LETTER
[facility]  [address]  [facility] [id number] [phone]    2018    Re: Allyn Pascal   :   1955  MRN:  [id number]   REFERRING PHYSICIAN:   Rasheeda Seaman    [facility]  Date of Initial Evaluation:  ***  Visits:  Rxs Used: 8  Reason for Referral:  Acute pain of right shoulder    PROGRESS  REPORT  Progress reporting period is from 17 to 18.       SUBJECTIVE  Patient reports her shoulder is a little sore from shoveling. Yesterday could lift her arm overhead with no pain.    Current Pain level: 4/10.     Initial Pain level: 6/10.   Changes in function:  Yes (See Goal flowsheet attached for changes in current functional level)  Adverse reaction to treatment or activity: None    OBJECTIVE  Evaluation ROM left shoulder flex 89, abd 95, ER 62, ext/IR T10. As of 18 AROM flexion 148 deg, abduction 155 deg abd, 80 deg ER, ext/IR T9. strength 5/5 ellbow flexion / ext, flex 3/5, 4-/5 abd, 5/5 IR, 3/5 ER.      ASSESSMENT/PLAN  Updated problem list and treatment plan: Diagnosis 1:  Right shoulder pain / rotator cuff tear  Pain -  hot/cold therapy, manual therapy, self management, education, directional preference exercise and home program  Decreased ROM/flexibility - manual therapy, therapeutic exercise, therapeutic activity and home program  Decreased joint mobility - manual therapy, therapeutic exercise, therapeutic activity and home program  Decreased strength - therapeutic exercise, therapeutic activities and home program  Decreased function - therapeutic activities and home program  Impaired posture - neuro re-education, therapeutic activities and home program  STG/LTGs have been met or progress has been made towards goals:  Yes (See Goal flow sheet completed today.)  Assessment of Progress: The patient's condition is improving.  Self Management Plans:  Patient is  independent in a home treatment program.  I have re-evaluated this patient and find that the nature, scope, duration and intensity of the therapy is appropriate for the medical condition of the patient.  Allyn continues to require the following intervention to meet STG and LTG's:  PT    Recommendations:  This patient would benefit from continued therapy.     Frequency:  1 X week, once daily  Duration:  for 4 weeks            Thank you for your referral.    INQUIRIES  Therapist: Jeremy Sarah DPVANESSA  [facility]  [address] [place] [address] [address]  [facility] [address]  Phone: [phone]  Fax: [phone]

## 2018-01-31 ENCOUNTER — THERAPY VISIT (OUTPATIENT)
Dept: PHYSICAL THERAPY | Facility: CLINIC | Age: 63
End: 2018-01-31
Payer: COMMERCIAL

## 2018-01-31 DIAGNOSIS — M25.511 ACUTE PAIN OF RIGHT SHOULDER: ICD-10-CM

## 2018-01-31 PROCEDURE — 97112 NEUROMUSCULAR REEDUCATION: CPT | Mod: GP | Performed by: PHYSICAL THERAPIST

## 2018-01-31 PROCEDURE — 97110 THERAPEUTIC EXERCISES: CPT | Mod: GP | Performed by: PHYSICAL THERAPIST

## 2018-02-07 ENCOUNTER — THERAPY VISIT (OUTPATIENT)
Dept: PHYSICAL THERAPY | Facility: CLINIC | Age: 63
End: 2018-02-07
Payer: COMMERCIAL

## 2018-02-07 DIAGNOSIS — M25.511 ACUTE PAIN OF RIGHT SHOULDER: ICD-10-CM

## 2018-02-07 PROCEDURE — 97110 THERAPEUTIC EXERCISES: CPT | Mod: GP | Performed by: PHYSICAL THERAPIST

## 2018-02-07 PROCEDURE — 97112 NEUROMUSCULAR REEDUCATION: CPT | Mod: GP | Performed by: PHYSICAL THERAPIST

## 2018-02-14 ENCOUNTER — THERAPY VISIT (OUTPATIENT)
Dept: PHYSICAL THERAPY | Facility: CLINIC | Age: 63
End: 2018-02-14
Payer: COMMERCIAL

## 2018-02-14 DIAGNOSIS — M25.511 ACUTE PAIN OF RIGHT SHOULDER: ICD-10-CM

## 2018-02-14 PROCEDURE — 97110 THERAPEUTIC EXERCISES: CPT | Mod: GP | Performed by: PHYSICAL THERAPIST

## 2018-02-14 PROCEDURE — 97112 NEUROMUSCULAR REEDUCATION: CPT | Mod: GP | Performed by: PHYSICAL THERAPIST

## 2018-02-21 ENCOUNTER — THERAPY VISIT (OUTPATIENT)
Dept: PHYSICAL THERAPY | Facility: CLINIC | Age: 63
End: 2018-02-21
Payer: COMMERCIAL

## 2018-02-21 DIAGNOSIS — M25.511 ACUTE PAIN OF RIGHT SHOULDER: ICD-10-CM

## 2018-02-21 PROCEDURE — 97110 THERAPEUTIC EXERCISES: CPT | Mod: GP | Performed by: PHYSICAL THERAPIST

## 2018-02-21 NOTE — LETTER
[facility] PHYSICAL [facility]  [address]  [facility] [address] [phone]    2018    Re: Allyn Pascal   :   1955  MRN:  [id number]   REFERRING PHYSICIAN:   Rasheeda Seaman    [facility]    Date of Initial Evaluation:  ***  Visits:  Rxs Used: 12  Reason for Referral:  Acute pain of right shoulder    EVALUATION SUMMARY    Subjective:  HPI                    Objective:  System    Physical Exam    General     ROS    Assessment/Plan:    DISCHARGE REPORT    Progress reporting period is from 18 to 18.       SUBJECTIVE  Patient shoulder feels pretty good still gets a little sore after strenghtening. elbow does feel a little better.     Current Pain level: 2/10.     Initial Pain level: 6/10.   Changes in function:  Yes (See Goal flowsheet attached for changes in current functional level)  Adverse reaction to treatment or activity: None    OBJECTIVE  150 deg flexion, 165 abd, 70 deg ER, ext/IR, Strength flex 4/5, 4+/5, ER 3+/5, IR 5/5. + upper limb tension testing.      ASSESSMENT/PLAN  Updated problem list and treatment plan: Diagnosis 1:  Right shoulder pain / rotator cuff tear  Pain -  home program  Decreased ROM/flexibility - home program  Decreased joint mobility - home program  Decreased strength - home program  Decreased function - home program  Impaired posture - home program  STG/LTGs have been met or progress has been made towards goals:  Yes (See Goal flow sheet completed today.)  Assessment of Progress: The patient's condition is improving.  Self Management Plans:  Patient is independent in a home treatment program.  I have re-evaluated this patient and find that the nature, scope, duration and intensity of the therapy is appropriate for the medical condition of the patient.  Allyn continues to require the following intervention to meet STG and LTG's:   PT    Recommendations:  This patient is ready to be discharged from therapy and continue their home treatment program.        Thank you for your referral.    INQUIRIES  Therapist:    [facility]  [address]  [facility] [address]  Phone: [phone]  Fax: [phone]

## 2018-02-21 NOTE — PROGRESS NOTES
Subjective:  HPI                    Objective:  System    Physical Exam    General     ROS    Assessment/Plan:    DISCHARGE REPORT    Progress reporting period is from 1/24/18 to 2/21/18.       SUBJECTIVE  Patient shoulder feels pretty good still gets a little sore after strenghtening. elbow does feel a little better.     Current Pain level: 2/10.     Initial Pain level: 6/10.   Changes in function:  Yes (See Goal flowsheet attached for changes in current functional level)  Adverse reaction to treatment or activity: None    OBJECTIVE  150 deg flexion, 165 abd, 70 deg ER, ext/IR, Strength flex 4/5, 4+/5, ER 3+/5, IR 5/5. + upper limb tension testing.      ASSESSMENT/PLAN  Updated problem list and treatment plan: Diagnosis 1:  Right shoulder pain / rotator cuff tear  Pain -  home program  Decreased ROM/flexibility - home program  Decreased joint mobility - home program  Decreased strength - home program  Decreased function - home program  Impaired posture - home program  STG/LTGs have been met or progress has been made towards goals:  Yes (See Goal flow sheet completed today.)  Assessment of Progress: The patient's condition is improving.  Self Management Plans:  Patient is independent in a home treatment program.  I have re-evaluated this patient and find that the nature, scope, duration and intensity of the therapy is appropriate for the medical condition of the patient.  Allyn continues to require the following intervention to meet STG and LTG's:  PT    Recommendations:  This patient is ready to be discharged from therapy and continue their home treatment program.    Please refer to the daily flowsheet for treatment today, total treatment time and time spent performing 1:1 timed codes.

## 2018-02-21 NOTE — MR AVS SNAPSHOT
"              After Visit Summary   2018    Allyn Pascal    MRN: [id number]           Patient Information     Date Of Birth          1955        Visit Information        Provider Department      2018 2:00 PM Jeremy Sarah PT [facility]        Today's Diagnoses     Acute pain of right shoulder           Follow-ups after your visit        Who to contact     If you have questions or need follow up information about today's clinic visit or your schedule please contact [facility] directly at [phone].  Normal or non-critical lab and imaging results will be communicated to you by [employer], letter or phone within 4 business days after the clinic has received the results. If you do not hear from us within 7 days, please contact the clinic through [employer] or phone. If you have a critical or abnormal lab result, we will notify you by phone as soon as possible.  Submit refill requests through [employer] or call your pharmacy and they will forward the refill request to us. Please allow 3 business days for your refill to be completed.          Additional Information About Your Visit        MyChart Information     [employer] lets you send messages to your doctor, view your test results, renew your prescriptions, schedule appointments and more. To sign up, go to [email or web address] . Click on \"Log in\" on the left side of the screen, which will take you to the Welcome page. Then click on \"Sign up Now\" on the right side of the page.     You will be asked to enter the access code listed below, as well as some personal information. Please follow the directions to create your username and password.     Your access code is: [email or web address]  Expires: 5/15/2018  2:50 PM     Your access code will  in 90 days. If you need help or a new code, please call your [place] clinic or [phone].        Care EveryWhere ID  "    This is your Care EveryWhere ID. This could be used by other organizations to access your [place] medical records  [id number]         Blood Pressure from Last 3 Encounters:   11/14/17 138/84   01/22/15 132/80   01/21/09 130/90    Weight from Last 3 Encounters:   11/29/17 70.8 kg (156 lb)   11/22/17 70.8 kg (156 lb)   11/14/17 70.8 kg (156 lb)              We Performed the Following     JANELLE PROGRESS NOTES REPORT     THERAPEUTIC EXERCISES        Primary Care Provider Office Phone # Fax #    Criss Wanda Odom [phone]       [address] [address]        Equal Access to Services     WILLIAM BUTLER : Carrie Trejo, falguni mendoza, ana maría baldwin, rober dugan . [facility] [phone].    ATENCIÓN: Si habla español, tiene a bess disposición servicios gratuitos de asistencia lingüística. Llame al [phone].    We comply with applicable federal civil rights laws and [place] laws. We do not discriminate on the basis of race, color, national origin, age, disability, sex, sexual orientation, or gender identity.            Thank you!     Thank you for choosing [facility]  for your care. Our goal is always to provide you with excellent care. Hearing back from our patients is one way we can continue to improve our services. Please take a few minutes to complete the written survey that you may receive in the mail after your visit with us. Thank you!             Your Updated Medication List - Protect others around you: Learn how to safely use, store and throw away your medicines at www.disposemymeds.org.      Notice  As of 2/21/2018  2:46 PM    You have not been prescribed any medications.

## 2018-03-12 ENCOUNTER — TELEPHONE (OUTPATIENT)
Dept: FAMILY MEDICINE | Facility: OTHER | Age: 63
End: 2018-03-12

## 2018-03-12 NOTE — LETTER
[facility]  [address]   [facility] [address]  Phone: [phone]  March 12, 2018      Allyn Pascal  [facility] [address]      Dear Allyn,    We care about your health and have reviewed your health plan including your medical conditions, medications, and lab results.  Based on this review, it is recommended that you follow up regarding the following health topic(s):  -Colon Cancer Screening    We recommend you take the following action(s):  -schedule a COLONOSCOPY to look for colon cancer (due every 10 years or 5 years in higher risk situations.)  Colonoscopies can prevent 90-95% of colon cancer deaths.  Problem lesions can be removed before they ever become cancer.  If you do not wish to do a colonoscopy or cannot afford to do one at this time, there is another option called a Fecal Immunochemical Occult Blood Test (FIT) a take home stool sample kit.  It does not replace the colonoscopy for colorectal cancer screening, but it can detect hidden bleeding in the lower colon.  It does need to be repeated every year and if a positive result is obtained, you would be referred for a colonoscopy.  If you have completed either one of these tests at another facility, please have the records sent to our clinic for our records.     Please call us at the [facility] - [phone] (or use [employer]) to address the above recommendations.     Thank you for trusting [facility] and we appreciate the opportunity to serve you.  We look forward to supporting your healthcare needs in the future.    Healthy Regards,    Your Health Care Team  [facility]

## 2018-03-12 NOTE — TELEPHONE ENCOUNTER
Summary:    Patient is due/failing the following:   COLONOSCOPY    Action needed:   Schedule a colonoscopy or complete a FIT test     Type of outreach:    Sent letter.    Questions for provider review:    None                                                                                                                                    Zulema Kenny     Chart routed to Care Team .      Panel Management Review      Patient has the following on her problem list: None      Composite cancer screening  Chart review shows that this patient is due/due soon for the following Colonoscopy, patient declines Mammogram

## 2018-06-12 ENCOUNTER — TELEPHONE (OUTPATIENT)
Dept: FAMILY MEDICINE | Facility: OTHER | Age: 63
End: 2018-06-12

## 2018-06-12 NOTE — TELEPHONE ENCOUNTER
Summary:    Patient is due/failing the following:   COLONOSCOPY and MAMMOGRAM    Action needed:   Schedule a mammogram and colonoscopy or complete a FIT test     Type of outreach:    Phone, spoke to patient.  patient states she will not complete either one    Questions for provider review:    None                                                                                                                                    Zulema Kenny       Chart routed to Care Team .        Panel Management Review      Patient has the following on her problem list: None      Composite cancer screening  Chart review shows that this patient is due/due soon for the following Mammogram and Colonoscopy

## 2019-06-17 PROBLEM — M75.101 TEAR OF RIGHT ROTATOR CUFF: Status: ACTIVE | Noted: 2017-11-22

## 2019-08-26 NOTE — PROGRESS NOTES
Subjective:  HPI                    Objective:  System    Physical Exam    General     ROS    Assessment/Plan:    PROGRESS  REPORT    Progress reporting period is from 12/6/17 to 1/24/18.       SUBJECTIVE  Patient reports her shoulder is a little sore from shoveling. Yesterday could lift her arm overhead with no pain.    Current Pain level: 4/10.     Initial Pain level: 6/10.   Changes in function:  Yes (See Goal flowsheet attached for changes in current functional level)  Adverse reaction to treatment or activity: None    OBJECTIVE  Evaluation ROM left shoulder flex 89, abd 95, ER 62, ext/IR T10. As of 1/24/18 AROM flexion 148 deg, abduction 155 deg abd, 80 deg ER, ext/IR T9. strength 5/5 ellbow flexion / ext, flex 3/5, 4-/5 abd, 5/5 IR, 3/5 ER.      ASSESSMENT/PLAN  Updated problem list and treatment plan: Diagnosis 1:  Right shoulder pain / rotator cuff tear  Pain -  hot/cold therapy, manual therapy, self management, education, directional preference exercise and home program  Decreased ROM/flexibility - manual therapy, therapeutic exercise, therapeutic activity and home program  Decreased joint mobility - manual therapy, therapeutic exercise, therapeutic activity and home program  Decreased strength - therapeutic exercise, therapeutic activities and home program  Decreased function - therapeutic activities and home program  Impaired posture - neuro re-education, therapeutic activities and home program  STG/LTGs have been met or progress has been made towards goals:  Yes (See Goal flow sheet completed today.)  Assessment of Progress: The patient's condition is improving.  Self Management Plans:  Patient is independent in a home treatment program.  I have re-evaluated this patient and find that the nature, scope, duration and intensity of the therapy is appropriate for the medical condition of the patient.  Allyn continues to require the following intervention to meet STG and LTG's:  PT    Recommendations:  This  patient would benefit from continued therapy.     Frequency:  1 X week, once daily  Duration:  for 4 weeks        Please refer to the daily flowsheet for treatment today, total treatment time and time spent performing 1:1 timed codes.             Jew/ethnic/cultural/personal food preferences

## 2023-01-15 NOTE — LETTER
8/18/2017         RE: Allyn Pascal  [address] [place] [id number]        Dear Colleague,    Thank you for referring your patient, Allyn Pascal, to the [facility]. Please see a copy of my visit note below.    ORTHOPEDIC CONSULT      Chief Complaint: Allyn Pasacl is a 61 year old female who is OOW.      She is being seen for   Chief Complaints and History of Present Illnesses   Patient presents with     Consult     left knee          History of Present Illness:   Allyn Pascal is a 61 year old female who is seen in consultation at the request of no one.  History of Present illness:  Allyn presents for evaluation of:  1.) left knee  Onset:  2 days ago    Symptoms brought on by not sure.   Location:  left knee.    Character:  numbness.    Progression of symptoms:  better.    Previous similar pain: no .   Pain Level:  0/10.   Previous treatments:  deep heating rub.  Currently on Blood thinners? no  Diagnosis of Diabetes? No  Was moving heavy boxes and started having knee pain a few days ago  Has decreased ROM, lat>med knee pain  Has been using a maxi freeze rub which helps  Has improved, increased ROM now      Patient's past medical, surgical, social and family histories reviewed.     Past Medical History:   Diagnosis Date     Endometriosis, site unspecified      Migraine, unspecified, without mention of intractable migraine without mention of status migrainosus      Other and unspecified ovarian cyst        Past Surgical History:   Procedure Laterality Date     C NONSPECIFIC PROCEDURE  02/24/2003    Exam under anesthesia, exploratory laparotomy, left ureterolysis, left salpingo-oophorectomy, extensive lysis of adhesions, rigid proctoscopy, repair of serosal tear of sigmoid colon.     C TOTAL ABDOM HYSTERECTOMY  1994    Hysterectomy, Total Abdominal     HC REMOVAL OF OVARY/TUBE(S)  02/24/2003    Salpingo-Oophorectomy, Unilateral Left     HC REMOVAL OF TONSILS,<10 Y/O      Tonsils <11y.o.  "      Medications:    Current Outpatient Prescriptions on File Prior to Visit:  cyclobenzaprine (FLEXERIL) 5 MG tablet Take 1 tablet (5 mg) by mouth 3 times daily as needed for muscle spasms   Propranolol HCl 60 MG CP24 Take 1 capsule by mouth daily.   INDERAL 20 MG OR TABS one tab twice daily     No current facility-administered medications on file prior to visit.     Allergies   Allergen Reactions     Sulfa Drugs      difficult breathing       Social History     Occupational History     Not on file.     Social History Main Topics     Smoking status: Passive Smoke Exposure - Never Smoker     Smokeless tobacco: Not on file      Comment:  smokes in the home     Alcohol use No     Drug use: Not on file     Sexual activity: Yes     Partners: Male       No family history on file.    REVIEW OF SYSTEMS  10 point review systems performed otherwise negative as noted as per history of present illness.    Physical Exam:  Vitals: Temp 97  F (36.1  C)  Ht 1.562 m (5' 1.5\")  Wt 67.1 kg (148 lb)  BMI 27.51 kg/m2  BMI= Body mass index is 27.51 kg/(m^2).    Constitutional: healthy, alert and no acute distress   Psychiatric: mentation appears normal and affect normal/bright  NEURO: no focal deficits  RESP: Normal with easy respirations and no use of accessory muscles to breathe, no audible wheezing or retractions  CV: regular pulse  SKIN: No erythema, rashes, excoriation, or breakdown. No evidence of infection.   JOINT/EXTREMITIES:left Knee Exam: Inspection: AP/lateral alignment normal, No effusion, No quad atrophy  Tender: medial and lateral hamstring region  Non-tender: MCL, LCL, lateral joint line, medial joint line  Active Range of Motion: all normal  Strength: normal  Special tests: normal Valgus stress test, normal Varus, negative Lachman's test      GAIT: antalgic  Lymph: no palpable lymph nodes    Diagnostic Modalities:  left knee X-ray: No fracture, dislocation and or lesion. Normal alignment.  Joint space " maintained no significant arthritis. No appreciable soft tissue abnormality  Independent visualization of the images was performed.      Impression: left hamstring strain    Plan:  All of the above pertinent physical exam and imaging modalities findings was reviewed with Allyn.                                          CONSERVATIVE CARE:  I recommend conservative care for the patient to include focused self directed physical therapy, activity modifications. Today I provided or dispensed info and hep.                                                FUTURE PLAN:  On their return if they still have symptoms we will consider physical therapy, MRI, NSAID's.      Return to clinic PRN, or sooner as needed for changes.  Re-x-ray on return: No    Rasheeda Seaman M.D.    Again, thank you for allowing me to participate in the care of your patient.        Sincerely,        Rasheeda Seaman MD     Patient/Caregiver provided printed discharge information.

## 2024-06-17 PROBLEM — Z71.89 ADVANCED DIRECTIVES, COUNSELING/DISCUSSION: Status: RESOLVED | Noted: 2017-12-15 | Resolved: 2024-06-17
